# Patient Record
Sex: FEMALE | Race: WHITE | Employment: FULL TIME | URBAN - METROPOLITAN AREA
[De-identification: names, ages, dates, MRNs, and addresses within clinical notes are randomized per-mention and may not be internally consistent; named-entity substitution may affect disease eponyms.]

---

## 2017-02-09 ENCOUNTER — TELEPHONE (OUTPATIENT)
Dept: INTERNAL MEDICINE CLINIC | Facility: CLINIC | Age: 47
End: 2017-02-09

## 2017-02-09 DIAGNOSIS — M17.12 OSTEOARTHRITIS OF LEFT KNEE, UNSPECIFIED OSTEOARTHRITIS TYPE: Primary | ICD-10-CM

## 2017-02-09 DIAGNOSIS — Z00.00 ENCOUNTER FOR ROUTINE HISTORY AND PHYSICAL EXAM IN FEMALE PATIENT: ICD-10-CM

## 2017-02-09 DIAGNOSIS — M77.12 LATERAL EPICONDYLITIS, LEFT ELBOW: ICD-10-CM

## 2017-02-09 NOTE — TELEPHONE ENCOUNTER
Pt would like three referrals. First referral is for Dr. Woodfin Simmonds for follow up on the elbow. Pt has an appt on Tuesday 2-14-17 @ 3:00 pm    2nd referral is for Dr. Woodfin Simmonds for follow up on the left knee.  Pt does not have an

## 2017-02-27 ENCOUNTER — LAB ENCOUNTER (OUTPATIENT)
Dept: LAB | Facility: HOSPITAL | Age: 47
End: 2017-02-27
Attending: ORTHOPAEDIC SURGERY
Payer: COMMERCIAL

## 2017-02-27 DIAGNOSIS — M77.8 TENDONITIS OF ELBOW, LEFT: ICD-10-CM

## 2017-02-27 DIAGNOSIS — M17.12 PRIMARY LOCALIZED OSTEOARTHRITIS OF LEFT KNEE: ICD-10-CM

## 2017-02-27 LAB
BASOPHILS # BLD: 0.1 K/UL (ref 0–0.2)
BASOPHILS NFR BLD: 1 %
CRP SERPL-MCNC: 0.5 MG/DL (ref 0–0.9)
EOSINOPHIL # BLD: 0.2 K/UL (ref 0–0.7)
EOSINOPHIL NFR BLD: 4 %
ERYTHROCYTE [DISTWIDTH] IN BLOOD BY AUTOMATED COUNT: 12.5 % (ref 11–15)
ERYTHROCYTE [SEDIMENTATION RATE] IN BLOOD: 5 MM/HR (ref 0–20)
HCT VFR BLD AUTO: 34.5 % (ref 35–48)
HGB BLD-MCNC: 11.7 G/DL (ref 12–16)
LYMPHOCYTES # BLD: 1.4 K/UL (ref 1–4)
LYMPHOCYTES NFR BLD: 28 %
MCH RBC QN AUTO: 29.7 PG (ref 27–32)
MCHC RBC AUTO-ENTMCNC: 33.8 G/DL (ref 32–37)
MCV RBC AUTO: 87.9 FL (ref 80–100)
MONOCYTES # BLD: 0.5 K/UL (ref 0–1)
MONOCYTES NFR BLD: 10 %
NEUTROPHILS # BLD AUTO: 2.9 K/UL (ref 1.8–7.7)
NEUTROPHILS NFR BLD: 57 %
PLATELET # BLD AUTO: 233 K/UL (ref 140–400)
PMV BLD AUTO: 9.6 FL (ref 7.4–10.3)
RBC # BLD AUTO: 3.92 M/UL (ref 3.7–5.4)
RHEUMATOID FACT SER QL: <5 IU/ML
WBC # BLD AUTO: 5 K/UL (ref 4–11)

## 2017-02-27 PROCEDURE — 85025 COMPLETE CBC W/AUTO DIFF WBC: CPT

## 2017-02-27 PROCEDURE — 86618 LYME DISEASE ANTIBODY: CPT

## 2017-02-27 PROCEDURE — 86812 HLA TYPING A B OR C: CPT

## 2017-02-27 PROCEDURE — 86140 C-REACTIVE PROTEIN: CPT

## 2017-02-27 PROCEDURE — 86431 RHEUMATOID FACTOR QUANT: CPT

## 2017-02-27 PROCEDURE — 86225 DNA ANTIBODY NATIVE: CPT

## 2017-02-27 PROCEDURE — 86038 ANTINUCLEAR ANTIBODIES: CPT

## 2017-02-27 PROCEDURE — 84165 PROTEIN E-PHORESIS SERUM: CPT

## 2017-02-27 PROCEDURE — 85652 RBC SED RATE AUTOMATED: CPT

## 2017-02-27 PROCEDURE — 36415 COLL VENOUS BLD VENIPUNCTURE: CPT

## 2017-02-28 LAB — ANA SER QL: NEGATIVE

## 2017-03-01 LAB
ALBUMIN SERPL ELPH-MCNC: 3.65 G/DL (ref 3.8–5.8)
ALBUMIN/GLOB SERPL: 1.09 {RATIO} (ref 1–2)
ALPHA1 GLOB SERPL ELPH-MCNC: 0.22 G/DL (ref 0.1–0.3)
ALPHA2 GLOB SERPL ELPH-MCNC: 0.97 G/DL (ref 0.6–1)
B BURGDOR IGG+IGM SER QL: NEGATIVE
B-GLOBULIN SERPL ELPH-MCNC: 1.26 G/DL (ref 0.7–1.3)
DSDNA AB TITR SER: <10 {TITER}
GAMMA GLOB SERPL ELPH-MCNC: 0.9 G/DL (ref 0.5–1.7)
HLA-B27: NEGATIVE
TOTAL PROTEIN (SPECIAL TESTING): 7 G/DL (ref 6.5–9.1)

## 2017-03-02 NOTE — TELEPHONE ENCOUNTER
Please READ Message in it's entirety  Pt still need referral for OBGYN(saw one in the past here)  Pt still need referral for DR. Viveros for Elbow   Please advise once approved

## 2017-03-02 NOTE — TELEPHONE ENCOUNTER
There are two auth ref on file to Dr. Sari Rivera.  OBGYN ref generated and tasked to [facility] for signoff

## 2017-03-14 PROBLEM — M77.12 LATERAL EPICONDYLITIS, LEFT ELBOW: Status: ACTIVE | Noted: 2017-03-14

## 2017-03-14 PROBLEM — M17.12 OSTEOARTHRITIS OF LEFT KNEE: Status: ACTIVE | Noted: 2017-03-14

## 2017-06-26 ENCOUNTER — OFFICE VISIT (OUTPATIENT)
Dept: OBGYN CLINIC | Facility: CLINIC | Age: 47
End: 2017-06-26

## 2017-06-26 VITALS
HEART RATE: 65 BPM | DIASTOLIC BLOOD PRESSURE: 75 MMHG | BODY MASS INDEX: 23.42 KG/M2 | HEIGHT: 67.5 IN | SYSTOLIC BLOOD PRESSURE: 118 MMHG | WEIGHT: 151 LBS

## 2017-06-26 DIAGNOSIS — Z01.411 ENCOUNTER FOR GYNECOLOGICAL EXAMINATION WITH ABNORMAL FINDING: Primary | ICD-10-CM

## 2017-06-26 DIAGNOSIS — N63.0 LUMP OR MASS IN BREAST: ICD-10-CM

## 2017-06-26 PROCEDURE — 99386 PREV VISIT NEW AGE 40-64: CPT | Performed by: OBSTETRICS & GYNECOLOGY

## 2017-06-26 NOTE — PROGRESS NOTES
Kenji Perry is a 48year old female  Patient's last menstrual period was 2017. Patient presents with:  Gyn Exam: ANNUAL EXAM --new pt  Breast Lump: thought felt something one month ago -- not present anymore  .     OBSTETRICS HISTORY: Exercise Yes    Comment: walking and biking    Pt has a pacemaker No    Pt has a defibrillator No    Reaction to local anesthetic No     Social History Narrative    The patient does not use an assistive device. .      The patient does live in a home with s Lymphatic:no abnormal supraclavicular or axillary adenopathy is noted  Breast: normal without palpable masses, tenderness, asymmetry, nipple discharge, nipple retraction or skin changes  (+) 1 cm smooth lump in right breast only felt in sitting position

## 2017-07-18 ENCOUNTER — HOSPITAL ENCOUNTER (OUTPATIENT)
Dept: ULTRASOUND IMAGING | Facility: HOSPITAL | Age: 47
Discharge: HOME | End: 2017-07-18
Attending: OBSTETRICS & GYNECOLOGY
Payer: COMMERCIAL

## 2017-07-18 ENCOUNTER — HOSPITAL ENCOUNTER (OUTPATIENT)
Dept: MAMMOGRAPHY | Facility: HOSPITAL | Age: 47
Discharge: HOME | End: 2017-07-18
Attending: OBSTETRICS & GYNECOLOGY
Payer: COMMERCIAL

## 2017-07-18 DIAGNOSIS — N63.0 LUMP OR MASS IN BREAST: ICD-10-CM

## 2017-07-18 PROCEDURE — 77062 BREAST TOMOSYNTHESIS BI: CPT | Performed by: OBSTETRICS & GYNECOLOGY

## 2017-07-18 PROCEDURE — 76642 ULTRASOUND BREAST LIMITED: CPT | Performed by: OBSTETRICS & GYNECOLOGY

## 2017-07-18 PROCEDURE — 77066 DX MAMMO INCL CAD BI: CPT | Performed by: OBSTETRICS & GYNECOLOGY

## 2017-07-20 NOTE — PROGRESS NOTES
Send letter: Normal pap &  Negative high risk HPV. Continue with annual breast / pelvic exams (I will do pap if warrantied).

## 2017-07-20 NOTE — PROGRESS NOTES
Send letter. Normal mammogram -- simple cyst in breast on u/s. Next in one year. Encouraged to do monthly self breast exams.

## 2017-07-31 NOTE — TELEPHONE ENCOUNTER
Refill request for carbamazepine 200 mg, take 2 tabs in morning and 3 tabs at night, #150, no refills    LOV: 8/4/16  NOV: None

## 2017-08-02 ENCOUNTER — TELEPHONE (OUTPATIENT)
Dept: INTERNAL MEDICINE CLINIC | Facility: CLINIC | Age: 47
End: 2017-08-02

## 2017-08-02 RX ORDER — CARBAMAZEPINE 200 MG/1
TABLET ORAL
Qty: 150 TABLET | Refills: 0 | Status: SHIPPED | OUTPATIENT
Start: 2017-08-02 | End: 2017-08-30

## 2017-08-02 NOTE — TELEPHONE ENCOUNTER
PATIENT  NEEDS  2 REFERRAL     DR HEATHER ZAMUDIO [facility]     PATIENT HAS NOT SEEN DR Tamera Montano SINCE 2015   I EXPLAINED SHE WOULD HAVE TO MAKE APT WITH A NEW DOCTOR IN ORDER  TO GET THE REFERRALS.   SHE SAID SHE DID NOT SEE WHY SHE WOULD HAVE TO SEE SOMEONE

## 2017-08-03 NOTE — TELEPHONE ENCOUNTER
Called pt to explain by HMO rules the patient must see their primary physician at least once per year. Pt also needs to extablish with new PCP. At [address] PCP will issue referrals. Pt agreed to call back to schedule an appt.

## 2017-08-08 NOTE — PROGRESS NOTES
Normal pap &  Negative high risk HPV. Continue with annual breast / pelvic exams (I will do pap if warrantied). Letter sent via mail.

## 2017-08-09 ENCOUNTER — OFFICE VISIT (OUTPATIENT)
Dept: INTERNAL MEDICINE CLINIC | Facility: CLINIC | Age: 47
End: 2017-08-09

## 2017-08-09 VITALS
TEMPERATURE: 98 F | HEART RATE: 56 BPM | WEIGHT: 146 LBS | SYSTOLIC BLOOD PRESSURE: 115 MMHG | DIASTOLIC BLOOD PRESSURE: 70 MMHG | HEIGHT: 68 IN | BODY MASS INDEX: 22.13 KG/M2

## 2017-08-09 DIAGNOSIS — R56.9 SEIZURES (HCC): Primary | ICD-10-CM

## 2017-08-09 DIAGNOSIS — M17.12 PRIMARY OSTEOARTHRITIS OF LEFT KNEE: ICD-10-CM

## 2017-08-09 PROCEDURE — 99212 OFFICE O/P EST SF 10 MIN: CPT | Performed by: INTERNAL MEDICINE

## 2017-08-09 PROCEDURE — 99214 OFFICE O/P EST MOD 30 MIN: CPT | Performed by: INTERNAL MEDICINE

## 2017-08-09 NOTE — PROGRESS NOTES
Lemuel Marrero is a 48year old female. Patient presents with:  New Patient: referral neuro. and orthopedic doctor that is out of [facility] care.        HPI:   Pt is a   Used see dr Taisha Vasquez   C/o seizures and referrals   C/o left elbow tendonitis -- got rony recently brought some otc meds and its working , no sore throat  SKIN: denies any unusual skin lesions or rashes-previously had rash around her lips and use fluticasone OINT which had helped   RESPIRATORY: denies shortness of breath, cough, wheezing  CARDI

## 2017-08-30 RX ORDER — CARBAMAZEPINE 200 MG/1
TABLET ORAL
Qty: 150 TABLET | Refills: 0 | Status: SHIPPED | OUTPATIENT
Start: 2017-08-30 | End: 2017-09-11

## 2017-08-30 NOTE — TELEPHONE ENCOUNTER
Refill request for carbamazepine 200 mg, take 2 tabs in morning and 3 tabs in evening, #150, no refills    LOV: 8/4/16  NOV: 9/11/17

## 2017-09-11 ENCOUNTER — OFFICE VISIT (OUTPATIENT)
Dept: NEUROLOGY | Facility: CLINIC | Age: 47
End: 2017-09-11

## 2017-09-11 ENCOUNTER — APPOINTMENT (OUTPATIENT)
Dept: LAB | Facility: HOSPITAL | Age: 47
End: 2017-09-11
Attending: Other
Payer: COMMERCIAL

## 2017-09-11 VITALS
BODY MASS INDEX: 23 KG/M2 | HEART RATE: 68 BPM | RESPIRATION RATE: 16 BRPM | WEIGHT: 148 LBS | DIASTOLIC BLOOD PRESSURE: 66 MMHG | SYSTOLIC BLOOD PRESSURE: 106 MMHG

## 2017-09-11 DIAGNOSIS — G40.909 NONINTRACTABLE EPILEPSY WITHOUT STATUS EPILEPTICUS, UNSPECIFIED EPILEPSY TYPE (HCC): ICD-10-CM

## 2017-09-11 DIAGNOSIS — G40.909 NONINTRACTABLE EPILEPSY WITHOUT STATUS EPILEPTICUS, UNSPECIFIED EPILEPSY TYPE (HCC): Primary | ICD-10-CM

## 2017-09-11 LAB
ALT SERPL-CCNC: 19 U/L (ref 14–54)
AST SERPL-CCNC: 21 U/L (ref 15–41)
BASOPHILS # BLD: 0 K/UL (ref 0–0.2)
BASOPHILS NFR BLD: 1 %
EOSINOPHIL # BLD: 0.1 K/UL (ref 0–0.7)
EOSINOPHIL NFR BLD: 3 %
ERYTHROCYTE [DISTWIDTH] IN BLOOD BY AUTOMATED COUNT: 12.6 % (ref 11–15)
HCT VFR BLD AUTO: 35.9 % (ref 35–48)
HGB BLD-MCNC: 12 G/DL (ref 12–16)
LYMPHOCYTES # BLD: 1.3 K/UL (ref 1–4)
LYMPHOCYTES NFR BLD: 28 %
MCH RBC QN AUTO: 29.6 PG (ref 27–32)
MCHC RBC AUTO-ENTMCNC: 33.5 G/DL (ref 32–37)
MCV RBC AUTO: 88.3 FL (ref 80–100)
MONOCYTES # BLD: 0.4 K/UL (ref 0–1)
MONOCYTES NFR BLD: 9 %
NEUTROPHILS # BLD AUTO: 2.7 K/UL (ref 1.8–7.7)
NEUTROPHILS NFR BLD: 59 %
PLATELET # BLD AUTO: 250 K/UL (ref 140–400)
PMV BLD AUTO: 9 FL (ref 7.4–10.3)
RBC # BLD AUTO: 4.06 M/UL (ref 3.7–5.4)
WBC # BLD AUTO: 4.6 K/UL (ref 4–11)

## 2017-09-11 PROCEDURE — 36415 COLL VENOUS BLD VENIPUNCTURE: CPT

## 2017-09-11 PROCEDURE — 99213 OFFICE O/P EST LOW 20 MIN: CPT | Performed by: OTHER

## 2017-09-11 PROCEDURE — 84460 ALANINE AMINO (ALT) (SGPT): CPT

## 2017-09-11 PROCEDURE — 85025 COMPLETE CBC W/AUTO DIFF WBC: CPT | Performed by: OTHER

## 2017-09-11 PROCEDURE — 84450 TRANSFERASE (AST) (SGOT): CPT

## 2017-09-11 RX ORDER — CARBAMAZEPINE 200 MG/1
TABLET ORAL
Qty: 450 TABLET | Refills: 3 | Status: SHIPPED | OUTPATIENT
Start: 2017-09-11 | End: 2018-08-23

## 2017-09-11 NOTE — PROGRESS NOTES
She is no seizures over the last year. There is stress at home regarding two of her dogs do not get along. She has 3 dogs at home.  has a new job and bought a new car.   Follows with Dr. Nato Hernandez  and had a recent left knee injection which signifi

## 2017-12-13 ENCOUNTER — TELEPHONE (OUTPATIENT)
Dept: INTERNAL MEDICINE CLINIC | Facility: CLINIC | Age: 47
End: 2017-12-13

## 2017-12-13 DIAGNOSIS — M17.12 OSTEOARTHRITIS OF LEFT KNEE, UNSPECIFIED OSTEOARTHRITIS TYPE: Primary | ICD-10-CM

## 2017-12-13 NOTE — TELEPHONE ENCOUNTER
Pt called and requested a referral to see Dr Bird Hobbs. Pended referral.  Please review edwarois and sign off if you approve. Thank you!  Lynn Sprague [phone] [facility]

## 2017-12-26 ENCOUNTER — NURSE NAVIGATOR ENCOUNTER (OUTPATIENT)
Dept: HEMATOLOGY/ONCOLOGY | Facility: HOSPITAL | Age: 47
End: 2017-12-26

## 2017-12-26 NOTE — PROGRESS NOTES
Called to patient to follow-up from [facility] letter sent and to confirm listed family history. Discussed [facility] appointments with patient. Pt reports she will \"think about\" the [facility] appointment, does not wish to schedule at this time.   Confirmed patient's fa

## 2018-04-25 ENCOUNTER — OFFICE VISIT (OUTPATIENT)
Dept: INTERNAL MEDICINE CLINIC | Facility: CLINIC | Age: 48
End: 2018-04-25

## 2018-04-25 VITALS
HEIGHT: 68 IN | SYSTOLIC BLOOD PRESSURE: 116 MMHG | HEART RATE: 71 BPM | BODY MASS INDEX: 22.28 KG/M2 | DIASTOLIC BLOOD PRESSURE: 78 MMHG | WEIGHT: 147 LBS

## 2018-04-25 DIAGNOSIS — R56.9 SEIZURES (HCC): ICD-10-CM

## 2018-04-25 DIAGNOSIS — M54.50 ACUTE BILATERAL LOW BACK PAIN WITHOUT SCIATICA: Primary | ICD-10-CM

## 2018-04-25 DIAGNOSIS — M17.0 PRIMARY OSTEOARTHRITIS OF BOTH KNEES: ICD-10-CM

## 2018-04-25 DIAGNOSIS — M67.951 TENDINOPATHY OF RIGHT GLUTEUS MEDIUS: ICD-10-CM

## 2018-04-25 PROCEDURE — 99212 OFFICE O/P EST SF 10 MIN: CPT | Performed by: INTERNAL MEDICINE

## 2018-04-25 PROCEDURE — 99213 OFFICE O/P EST LOW 20 MIN: CPT | Performed by: INTERNAL MEDICINE

## 2018-04-25 NOTE — PATIENT INSTRUCTIONS
Tendonitis  A tendon is the thick fibrous cord that joins muscle to bone and allows joints to move. When a tendon becomes inflamed, it is called tendonitis. This can occur from overuse, injury, or infection.  This usually involves the shoulders, forearm, © 0230-5697 [employer] [address]. [address] [facility], [facility] [place]. All rights reserved. This information is not intended as a substitute for professional medical care. Always follow your healthcare professional's instructions.

## 2018-04-25 NOTE — PROGRESS NOTES
Claire Michael is a 50year old female.   Patient presents with:  Back Pain: started 2 weeks ago, aggrevated by changing postions      HPI:   Pt comes for f/u   c/c back pain   c/o back pain and knee pain -- got a shot yest and feels better on the knee arthroscopy Left 09-19-12    left knee arthropscopy   • Lasik Bilateral 2008    LASIK Plus in [place]   • Removal of ovarian cyst(s)      Laparoscopic Ovarian Cystectomy; per NG      Social History:  Smoking status: Never Smoker

## 2018-08-06 ENCOUNTER — TELEPHONE (OUTPATIENT)
Dept: INTERNAL MEDICINE CLINIC | Facility: CLINIC | Age: 48
End: 2018-08-06

## 2018-08-06 DIAGNOSIS — R56.9 SEIZURES (HCC): Primary | ICD-10-CM

## 2018-08-06 NOTE — TELEPHONE ENCOUNTER
Pt called requesting a referral to see Dr Piter Grant for follow-up visits r/t seizures. Please sign referral if you agree.  Thank you, Jenn, [facility]

## 2018-08-23 ENCOUNTER — OFFICE VISIT (OUTPATIENT)
Dept: NEUROLOGY | Facility: CLINIC | Age: 48
End: 2018-08-23

## 2018-08-23 VITALS
HEIGHT: 67.5 IN | BODY MASS INDEX: 22.49 KG/M2 | DIASTOLIC BLOOD PRESSURE: 64 MMHG | SYSTOLIC BLOOD PRESSURE: 108 MMHG | HEART RATE: 62 BPM | WEIGHT: 145 LBS

## 2018-08-23 DIAGNOSIS — R56.9 SEIZURE (HCC): Primary | ICD-10-CM

## 2018-08-23 PROCEDURE — 99212 OFFICE O/P EST SF 10 MIN: CPT | Performed by: OTHER

## 2018-08-23 RX ORDER — CARBAMAZEPINE 200 MG/1
TABLET ORAL
Qty: 450 TABLET | Refills: 3 | Status: SHIPPED | OUTPATIENT
Start: 2018-08-23 | End: 2019-10-20

## 2018-08-24 ENCOUNTER — MED REC SCAN ONLY (OUTPATIENT)
Dept: NEUROLOGY | Facility: CLINIC | Age: 48
End: 2018-08-24

## 2018-08-24 NOTE — PROGRESS NOTES
She has no neurological symptoms. No seizures over the last year. No new medical issues. No surgeries. She has 3 dogs at home. Discussed her upcoming 48th birthday. Filled out  form. Gave her prescription for CBC, liver function does.     Impre

## 2018-08-29 ENCOUNTER — OFFICE VISIT (OUTPATIENT)
Dept: INTERNAL MEDICINE CLINIC | Facility: CLINIC | Age: 48
End: 2018-08-29

## 2018-08-29 ENCOUNTER — APPOINTMENT (OUTPATIENT)
Dept: LAB | Age: 48
End: 2018-08-29
Attending: Other
Payer: COMMERCIAL

## 2018-08-29 VITALS
HEART RATE: 61 BPM | HEIGHT: 67.5 IN | BODY MASS INDEX: 23.11 KG/M2 | WEIGHT: 149 LBS | DIASTOLIC BLOOD PRESSURE: 71 MMHG | SYSTOLIC BLOOD PRESSURE: 127 MMHG

## 2018-08-29 DIAGNOSIS — R56.9 SEIZURE (HCC): ICD-10-CM

## 2018-08-29 DIAGNOSIS — M85.671 CYST OF BONE OF RIGHT FOOT: Primary | ICD-10-CM

## 2018-08-29 LAB
ALT SERPL-CCNC: 17 U/L (ref 14–54)
AST SERPL-CCNC: 20 U/L (ref 15–41)
BASOPHILS # BLD: 0 K/UL (ref 0–0.2)
BASOPHILS NFR BLD: 1 %
EOSINOPHIL # BLD: 0.2 K/UL (ref 0–0.7)
EOSINOPHIL NFR BLD: 4 %
ERYTHROCYTE [DISTWIDTH] IN BLOOD BY AUTOMATED COUNT: 12.6 % (ref 11–15)
HCT VFR BLD AUTO: 35.9 % (ref 35–48)
HGB BLD-MCNC: 12.1 G/DL (ref 12–16)
LYMPHOCYTES # BLD: 1.2 K/UL (ref 1–4)
LYMPHOCYTES NFR BLD: 33 %
MCH RBC QN AUTO: 29.5 PG (ref 27–32)
MCHC RBC AUTO-ENTMCNC: 33.6 G/DL (ref 32–37)
MCV RBC AUTO: 87.9 FL (ref 80–100)
MONOCYTES # BLD: 0.4 K/UL (ref 0–1)
MONOCYTES NFR BLD: 12 %
NEUTROPHILS # BLD AUTO: 1.9 K/UL (ref 1.8–7.7)
NEUTROPHILS NFR BLD: 50 %
PLATELET # BLD AUTO: 223 K/UL (ref 140–400)
PMV BLD AUTO: 9.5 FL (ref 7.4–10.3)
RBC # BLD AUTO: 4.09 M/UL (ref 3.7–5.4)
WBC # BLD AUTO: 3.8 K/UL (ref 4–11)

## 2018-08-29 PROCEDURE — 99212 OFFICE O/P EST SF 10 MIN: CPT | Performed by: INTERNAL MEDICINE

## 2018-08-29 PROCEDURE — 84460 ALANINE AMINO (ALT) (SGPT): CPT

## 2018-08-29 PROCEDURE — 36415 COLL VENOUS BLD VENIPUNCTURE: CPT | Performed by: OTHER

## 2018-08-29 PROCEDURE — 85025 COMPLETE CBC W/AUTO DIFF WBC: CPT | Performed by: OTHER

## 2018-08-29 PROCEDURE — 99213 OFFICE O/P EST LOW 20 MIN: CPT | Performed by: INTERNAL MEDICINE

## 2018-08-29 PROCEDURE — 84450 TRANSFERASE (AST) (SGOT): CPT

## 2018-08-29 NOTE — PROGRESS NOTES
Lara Harkins is a 50year old female.   Patient presents with:  Bump: top of right foot, pain when she puts on shoe       HPI:   Pt comes as an urgent visit   C/c right foot bump  C/o bump x one week but got worse 3 days ago -- usually wears [employer] [place]   • Removal of ovarian cyst(s)      Laparoscopic Ovarian Cystectomy; per NG      Social History:  Smoking status: Never Smoker                                                              Smokeless tobacco: Never Used                      Comment:

## 2018-08-29 NOTE — PATIENT INSTRUCTIONS
Bone Cyst  You have a bone cyst. This is a hole, or cavity, inside your bone that is filled with fluid. It looks like a hollow spot in the bone on an X-ray. It is a benign condition. This means it is not cancer.   Experts don’t know what causes bone cysts © 6341-4895 [employer] [address]. [address] [facility], [address] [place]. All rights reserved. This information is not intended as a substitute for professional medical care. Always follow your healthcare professional's instructions.         Bone Cy Date Last Reviewed: 11/20/2015  © 8446-5353 [employer] [address]. [address] [facility], [address] [place]. All rights reserved. This information is not intended as a substitute for professional medical care.  Always follow your healthcare [place]

## 2018-10-10 ENCOUNTER — LAB ENCOUNTER (OUTPATIENT)
Dept: LAB | Age: 48
End: 2018-10-10
Attending: INTERNAL MEDICINE
Payer: COMMERCIAL

## 2018-10-10 ENCOUNTER — OFFICE VISIT (OUTPATIENT)
Dept: INTERNAL MEDICINE CLINIC | Facility: CLINIC | Age: 48
End: 2018-10-10

## 2018-10-10 ENCOUNTER — TELEPHONE (OUTPATIENT)
Dept: NEUROLOGY | Facility: CLINIC | Age: 48
End: 2018-10-10

## 2018-10-10 VITALS
HEIGHT: 67.5 IN | HEART RATE: 60 BPM | SYSTOLIC BLOOD PRESSURE: 112 MMHG | WEIGHT: 147.19 LBS | BODY MASS INDEX: 22.83 KG/M2 | DIASTOLIC BLOOD PRESSURE: 71 MMHG

## 2018-10-10 DIAGNOSIS — K13.0 CHEILOSIS: ICD-10-CM

## 2018-10-10 DIAGNOSIS — S39.012A STRAIN OF LUMBAR REGION, INITIAL ENCOUNTER: Primary | ICD-10-CM

## 2018-10-10 DIAGNOSIS — J06.9 VIRAL UPPER RESPIRATORY TRACT INFECTION: ICD-10-CM

## 2018-10-10 PROCEDURE — 82607 VITAMIN B-12: CPT

## 2018-10-10 PROCEDURE — 99212 OFFICE O/P EST SF 10 MIN: CPT | Performed by: INTERNAL MEDICINE

## 2018-10-10 PROCEDURE — 80053 COMPREHEN METABOLIC PANEL: CPT

## 2018-10-10 PROCEDURE — 82746 ASSAY OF FOLIC ACID SERUM: CPT

## 2018-10-10 PROCEDURE — 36415 COLL VENOUS BLD VENIPUNCTURE: CPT

## 2018-10-10 PROCEDURE — 99214 OFFICE O/P EST MOD 30 MIN: CPT | Performed by: INTERNAL MEDICINE

## 2018-10-10 PROCEDURE — 85025 COMPLETE CBC W/AUTO DIFF WBC: CPT

## 2018-10-10 RX ORDER — CYCLOBENZAPRINE HCL 10 MG
10 TABLET ORAL NIGHTLY
Qty: 20 TABLET | Refills: 0 | Status: SHIPPED | OUTPATIENT
Start: 2018-10-10 | End: 2018-10-30

## 2018-10-10 RX ORDER — CYCLOBENZAPRINE HCL 10 MG
10 TABLET ORAL 3 TIMES DAILY
Qty: 30 TABLET | Refills: 1 | Status: SHIPPED | OUTPATIENT
Start: 2018-10-10 | End: 2018-10-10 | Stop reason: CLARIF

## 2019-01-03 ENCOUNTER — TELEPHONE (OUTPATIENT)
Dept: FAMILY MEDICINE CLINIC | Facility: CLINIC | Age: 49
End: 2019-01-03

## 2019-01-03 DIAGNOSIS — M17.12 OSTEOARTHRITIS OF LEFT KNEE, UNSPECIFIED OSTEOARTHRITIS TYPE: Primary | ICD-10-CM

## 2019-01-03 NOTE — TELEPHONE ENCOUNTER
Noted patient was seen for right foot pain and back pain--she does have a history of osteoarthritis of the knee  Noted patient had a referral December 2017--signed pended referral

## 2019-01-03 NOTE — TELEPHONE ENCOUNTER
Patient called requesting a referral to see Dr. Joie Parada for her apt on 1/8/19. Pended referral. Please review diagnosis and sign off if you agree.     Thank you,  [facility] [phone]

## 2019-02-27 ENCOUNTER — OFFICE VISIT (OUTPATIENT)
Dept: INTERNAL MEDICINE CLINIC | Facility: CLINIC | Age: 49
End: 2019-02-27

## 2019-02-27 VITALS
SYSTOLIC BLOOD PRESSURE: 105 MMHG | DIASTOLIC BLOOD PRESSURE: 65 MMHG | HEART RATE: 66 BPM | HEIGHT: 67.5 IN | BODY MASS INDEX: 23.11 KG/M2 | WEIGHT: 149 LBS

## 2019-02-27 DIAGNOSIS — R92.2 DENSE BREASTS: ICD-10-CM

## 2019-02-27 DIAGNOSIS — T14.8XXA MUSCLE STRAIN: Primary | ICD-10-CM

## 2019-02-27 DIAGNOSIS — Z12.31 VISIT FOR SCREENING MAMMOGRAM: ICD-10-CM

## 2019-02-27 PROCEDURE — 99212 OFFICE O/P EST SF 10 MIN: CPT | Performed by: INTERNAL MEDICINE

## 2019-02-27 PROCEDURE — 99213 OFFICE O/P EST LOW 20 MIN: CPT | Performed by: INTERNAL MEDICINE

## 2019-02-27 RX ORDER — CYCLOBENZAPRINE HCL 10 MG
10 TABLET ORAL 2 TIMES DAILY PRN
Qty: 20 TABLET | Refills: 1 | Status: SHIPPED | OUTPATIENT
Start: 2019-02-27 | End: 2019-03-19

## 2019-02-27 NOTE — PATIENT INSTRUCTIONS
Hip Strain    You have a strain of the muscles around the hip joint. A muscle strain is a stretching or tearing of muscle fibers. This causes pain, especially when you move that muscle. There may also be some swelling and bruising.   Home care  · Stay off · Losing the ability to put weight on the injured side  Date Last Reviewed: 11/19/2015  © 9609-3120 [employer] [address]. [address] [facility], [address] [place] [address]. All rights reserved.  This information is not intended as a substitute for professional

## 2019-03-06 ENCOUNTER — TELEPHONE (OUTPATIENT)
Dept: OTHER | Age: 49
End: 2019-03-06

## 2019-03-06 DIAGNOSIS — M17.12 OSTEOARTHRITIS OF LEFT KNEE, UNSPECIFIED OSTEOARTHRITIS TYPE: Primary | ICD-10-CM

## 2019-03-06 NOTE — TELEPHONE ENCOUNTER
Spoke with patient ( verified) and states she saw Dr. Bird Hobbs yesterday for her left knee pain. \"I'm not ready for a knee replacement, but I don't know if because I was favoring my left knee, it affected my right leg.  The pain is my right upper thi

## 2019-03-06 NOTE — TELEPHONE ENCOUNTER
Spoke with patient and advised Dr Evi Hodges note and verbalized understanding. PT number [phone] given to call for appointment.       Nery Samuel MD         3/6/19 2:07 PM   Note      Reviewed the orthopedic note– he was also saying that she may b

## 2019-03-06 NOTE — TELEPHONE ENCOUNTER
Reviewed the orthopedic note– he was also saying that she may benefit from some microvascular injections--  The right leg may be affected because of the imbalance and posture and gait   I will order PT and this can be evaluated by them and recommendations

## 2019-03-14 ENCOUNTER — OFFICE VISIT (OUTPATIENT)
Dept: PHYSICAL THERAPY | Facility: HOSPITAL | Age: 49
End: 2019-03-14
Attending: INTERNAL MEDICINE
Payer: COMMERCIAL

## 2019-03-14 DIAGNOSIS — M17.12 OSTEOARTHRITIS OF LEFT KNEE, UNSPECIFIED OSTEOARTHRITIS TYPE: ICD-10-CM

## 2019-03-14 PROCEDURE — 97162 PT EVAL MOD COMPLEX 30 MIN: CPT

## 2019-03-14 PROCEDURE — 97110 THERAPEUTIC EXERCISES: CPT

## 2019-03-14 NOTE — PROGRESS NOTES
LOWER EXTREMITY EVALUATION:   Referring Physician: Dr. Gaby Ramesh  Date of Onset: Feb 2019 Date of Service: 3/14/2019   Diagnosis: Osteoarthritis of left knee, unspecified osteoarthritis type (M17.12)  PATIENT SUMMARY:   Cindy Veliz is a 46year old bending, exercising. Pt scored 49% limitation on FOTO. Fab Pizarro would benefit from skilled Physical Therapy to address the above impairments to improve functional mobility.      In agreement with FOTO score and clinical rationale, this evaluation involve without R buttock or left knee pain. 3. Pt will demonstrate improved LE strength >4+/5 to be able to sit to stand and negotiate stairs painfree. Frequency / Duration: Patient will be seen for  2 x/week or a total of 8 visits over a 90 day period.  Judith

## 2019-03-20 ENCOUNTER — OFFICE VISIT (OUTPATIENT)
Dept: PHYSICAL THERAPY | Facility: HOSPITAL | Age: 49
End: 2019-03-20
Attending: INTERNAL MEDICINE
Payer: COMMERCIAL

## 2019-03-20 PROCEDURE — 97110 THERAPEUTIC EXERCISES: CPT

## 2019-03-20 NOTE — PROGRESS NOTES
Diagnosis:Osteoarthritis of left knee, unspecified osteoarthritis type (M17.12)    Authorized # of Visits:  8 (HMO)         Next MD visit: 4/1/19  L knee injection  Fall Risk: standard         Precautions: LBP, seizures, anxiety, OA, left arthroscope for m

## 2019-03-26 ENCOUNTER — OFFICE VISIT (OUTPATIENT)
Dept: PHYSICAL THERAPY | Facility: HOSPITAL | Age: 49
End: 2019-03-26
Attending: INTERNAL MEDICINE
Payer: COMMERCIAL

## 2019-03-26 PROCEDURE — 97110 THERAPEUTIC EXERCISES: CPT

## 2019-03-26 PROCEDURE — 97140 MANUAL THERAPY 1/> REGIONS: CPT

## 2019-03-26 NOTE — PROGRESS NOTES
Diagnosis:Osteoarthritis of left knee, unspecified osteoarthritis type (M17.12)    Authorized # of Visits:  8 (HMO)         Next MD visit: 4/1/19  L knee injection  Fall Risk: standard         Precautions: LBP, seizures, anxiety, OA, left arthroscope for m stairs painfree. Plan: Continue for stretching, strengthening, ROM, postural training, HEP training, manual therapy, balance and proprioception training. Charges:  TherEx2, MTx1      Total Timed Treatment: 40 min  Total Treatment Time: 43 min

## 2019-03-27 ENCOUNTER — TELEPHONE (OUTPATIENT)
Dept: INTERNAL MEDICINE CLINIC | Facility: CLINIC | Age: 49
End: 2019-03-27

## 2019-03-27 RX ORDER — MELOXICAM 7.5 MG/1
7.5 TABLET ORAL DAILY
Qty: 30 TABLET | Refills: 0 | Status: SHIPPED | OUTPATIENT
Start: 2019-03-27 | End: 2019-09-07

## 2019-03-27 NOTE — TELEPHONE ENCOUNTER
Pt returning call. Name and  verified. Pt states while working with Physical Therapist, she experiences pain from buttocks radiating down to back of thigh, leg and down to the calf. Pt states can feel like cramping pain.  Physical Therapist suggests medi

## 2019-03-27 NOTE — TELEPHONE ENCOUNTER
Pt stt her physical therapy wants her to ask  can she prescribe her a medication for sciatic nerve pain       Please advise

## 2019-03-27 NOTE — TELEPHONE ENCOUNTER
LMTCB. Please transfer to triage. It looks like pt's PT referral was for her knee, but she is complaining of sciatic nerve pain.   Please further inquire about pt's pain as Osteoarthritis of the knee may have a different medicine option from sciatic nerve

## 2019-03-27 NOTE — TELEPHONE ENCOUNTER
cLLED and spoke to pt and she is taking ibuprofen   Will try meloxicam and aware not to take with ibuprofen

## 2019-03-28 ENCOUNTER — OFFICE VISIT (OUTPATIENT)
Dept: PHYSICAL THERAPY | Facility: HOSPITAL | Age: 49
End: 2019-03-28
Attending: INTERNAL MEDICINE
Payer: COMMERCIAL

## 2019-03-28 PROCEDURE — 97110 THERAPEUTIC EXERCISES: CPT

## 2019-03-28 PROCEDURE — 97140 MANUAL THERAPY 1/> REGIONS: CPT

## 2019-03-28 NOTE — PROGRESS NOTES
Diagnosis:Osteoarthritis of left knee, unspecified osteoarthritis type (M17.12)    Authorized # of Visits:  8 (HMO)         Next MD visit: 4/1/19  L knee injection  Fall Risk: standard         Precautions: LBP, seizures, anxiety, OA, left arthroscope for m directional preference; initiated lumbar stabilization with pressure biofeedback. Goals:    1. Pt will be I with HEP to improve therapy outcome and self manage symptoms.   2. Pt will demonstrate improved hamstring flexibility to be able to tolerate wa

## 2019-04-01 ENCOUNTER — TELEPHONE (OUTPATIENT)
Dept: INTERNAL MEDICINE CLINIC | Facility: CLINIC | Age: 49
End: 2019-04-01

## 2019-04-01 NOTE — TELEPHONE ENCOUNTER
Pt states taking Meloxicam X  One week  With little relief. Has been going to PT for a few weeks for right hip and glut pain. Prior to Meloxicam was taking Motrin with no relief. Pt states PT wondered if Medrol pack may work better. Please advise.

## 2019-04-01 NOTE — TELEPHONE ENCOUNTER
Noted patient saw orthopedic doctor today--and received a shot--even though this was to her knee--this may be causing referred pain   this shot may help advised to hold off on steroids for now  Return to clinic if symptoms do not improve  Steroids cannot b

## 2019-04-02 ENCOUNTER — APPOINTMENT (OUTPATIENT)
Dept: PHYSICAL THERAPY | Facility: HOSPITAL | Age: 49
End: 2019-04-02
Attending: INTERNAL MEDICINE
Payer: COMMERCIAL

## 2019-04-03 ENCOUNTER — TELEPHONE (OUTPATIENT)
Dept: INTERNAL MEDICINE CLINIC | Facility: CLINIC | Age: 49
End: 2019-04-03

## 2019-04-03 ENCOUNTER — OFFICE VISIT (OUTPATIENT)
Dept: INTERNAL MEDICINE CLINIC | Facility: CLINIC | Age: 49
End: 2019-04-03

## 2019-04-03 VITALS
RESPIRATION RATE: 18 BRPM | HEIGHT: 67.5 IN | DIASTOLIC BLOOD PRESSURE: 64 MMHG | SYSTOLIC BLOOD PRESSURE: 112 MMHG | WEIGHT: 143 LBS | BODY MASS INDEX: 22.18 KG/M2 | HEART RATE: 60 BPM

## 2019-04-03 DIAGNOSIS — M67.951 TENDINOPATHY OF RIGHT GLUTEUS MEDIUS: Primary | ICD-10-CM

## 2019-04-03 PROCEDURE — 99213 OFFICE O/P EST LOW 20 MIN: CPT | Performed by: INTERNAL MEDICINE

## 2019-04-03 PROCEDURE — 99212 OFFICE O/P EST SF 10 MIN: CPT | Performed by: INTERNAL MEDICINE

## 2019-04-03 RX ORDER — METHYLPREDNISOLONE 4 MG/1
TABLET ORAL
Qty: 1 KIT | Refills: 0 | Status: SHIPPED | OUTPATIENT
Start: 2019-04-03 | End: 2019-04-16 | Stop reason: ALTCHOICE

## 2019-04-03 NOTE — PROGRESS NOTES
Marely March is a 46year old female.   Patient presents with:  Hip Pain: right   Leg Pain      HPI:   Pt comes for a f/u  C/c right hip pain   C/o so pain is still there meloxicam, cyclobenzaprine and physical therapy has not helped and thinks that Vitamins-Minerals (MULTI VITAMIN/MINERALS) Oral Tab Take  by mouth daily.  Disp:  Rfl:       Past Medical History:   Diagnosis Date   • Anxiety state, unspecified    • Hip pain    • History of back pain     per NG   • History of seizure     Seizure/Epilepsy paraspinal tenderness, no hip tenderness, straight leg test negative bilaterally, full range of motion of bilateral hips  No swelling noted except in the left knee    ASSESSMENT AND PLAN:   Diagnoses and all orders for this visit:    Tendinopathy of right

## 2019-04-03 NOTE — TELEPHONE ENCOUNTER
Pt was seen today and referred to Ortho. Pt stts she was asked by Ortho to contact PCP to request xray orders so results can be reviewed during first visit . Please advise , thank you.

## 2019-04-04 ENCOUNTER — APPOINTMENT (OUTPATIENT)
Dept: PHYSICAL THERAPY | Facility: HOSPITAL | Age: 49
End: 2019-04-04
Attending: INTERNAL MEDICINE
Payer: COMMERCIAL

## 2019-04-04 ENCOUNTER — TELEPHONE (OUTPATIENT)
Dept: OTHER | Age: 49
End: 2019-04-04

## 2019-04-04 NOTE — TELEPHONE ENCOUNTER
Pt would like to know will the Medrol Dose pack interfere with the MRI results? MRI scheduled on 4/11/19.  Please advise

## 2019-04-10 ENCOUNTER — APPOINTMENT (OUTPATIENT)
Dept: PHYSICAL THERAPY | Facility: HOSPITAL | Age: 49
End: 2019-04-10
Attending: INTERNAL MEDICINE
Payer: COMMERCIAL

## 2019-04-11 ENCOUNTER — HOSPITAL ENCOUNTER (OUTPATIENT)
Dept: MRI IMAGING | Facility: HOSPITAL | Age: 49
Discharge: HOME | End: 2019-04-11
Attending: INTERNAL MEDICINE
Payer: COMMERCIAL

## 2019-04-11 DIAGNOSIS — M67.951 TENDINOPATHY OF RIGHT GLUTEUS MEDIUS: ICD-10-CM

## 2019-04-11 PROCEDURE — 73721 MRI JNT OF LWR EXTRE W/O DYE: CPT | Performed by: INTERNAL MEDICINE

## 2019-04-16 ENCOUNTER — TELEPHONE (OUTPATIENT)
Dept: INTERNAL MEDICINE CLINIC | Facility: CLINIC | Age: 49
End: 2019-04-16

## 2019-04-16 DIAGNOSIS — M67.951 TENDINOPATHY OF RIGHT GLUTEUS MEDIUS: Primary | ICD-10-CM

## 2019-04-16 DIAGNOSIS — M67.98 UNSPECIFIED DISORDER OF SYNOVIUM AND TENDON, OTHER SITE: ICD-10-CM

## 2019-04-16 DIAGNOSIS — M17.12 OSTEOARTHRITIS OF LEFT KNEE, UNSPECIFIED OSTEOARTHRITIS TYPE: ICD-10-CM

## 2019-04-16 PROBLEM — M79.18 RIGHT BUTTOCK PAIN: Status: ACTIVE | Noted: 2019-04-16

## 2019-04-16 PROBLEM — M79.604 RIGHT LEG PAIN: Status: ACTIVE | Noted: 2019-04-16

## 2019-04-16 PROBLEM — M54.31 SCIATICA OF RIGHT SIDE: Status: ACTIVE | Noted: 2019-04-16

## 2019-04-16 NOTE — TELEPHONE ENCOUNTER
Patient requesting a referral to Dr. Eliana Mccallum office visit today at 245pm.    Please sign off this referral.    Thanks,    Luis A

## 2019-04-17 ENCOUNTER — APPOINTMENT (OUTPATIENT)
Dept: PHYSICAL THERAPY | Facility: HOSPITAL | Age: 49
End: 2019-04-17
Attending: INTERNAL MEDICINE
Payer: COMMERCIAL

## 2019-04-24 ENCOUNTER — HOSPITAL ENCOUNTER (OUTPATIENT)
Dept: MRI IMAGING | Facility: HOSPITAL | Age: 49
Discharge: HOME | End: 2019-04-24
Attending: ORTHOPAEDIC SURGERY
Payer: COMMERCIAL

## 2019-04-24 DIAGNOSIS — M54.31 SCIATICA OF RIGHT SIDE: ICD-10-CM

## 2019-04-24 DIAGNOSIS — M79.18 RIGHT BUTTOCK PAIN: ICD-10-CM

## 2019-04-24 DIAGNOSIS — M79.604 RIGHT LEG PAIN: ICD-10-CM

## 2019-04-24 PROCEDURE — 72148 MRI LUMBAR SPINE W/O DYE: CPT | Performed by: ORTHOPAEDIC SURGERY

## 2019-04-30 PROBLEM — M51.26 LUMBAR HERNIATED DISC: Status: ACTIVE | Noted: 2019-04-30

## 2019-05-01 ENCOUNTER — TELEPHONE (OUTPATIENT)
Dept: INTERNAL MEDICINE CLINIC | Facility: CLINIC | Age: 49
End: 2019-05-01

## 2019-05-01 NOTE — TELEPHONE ENCOUNTER
The referral has been placed by Dr Conchita Hoang office and this os perfectly appropriate.      Please reference referral # [id number]    Thank you, Zackery

## 2019-05-08 ENCOUNTER — OFFICE VISIT (OUTPATIENT)
Dept: PHYSICAL THERAPY | Facility: HOSPITAL | Age: 49
End: 2019-05-08
Attending: ORTHOPAEDIC SURGERY
Payer: COMMERCIAL

## 2019-05-08 DIAGNOSIS — M79.18 RIGHT BUTTOCK PAIN: ICD-10-CM

## 2019-05-08 DIAGNOSIS — M54.31 SCIATICA OF RIGHT SIDE: ICD-10-CM

## 2019-05-08 DIAGNOSIS — M51.26 LUMBAR HERNIATED DISC: ICD-10-CM

## 2019-05-08 DIAGNOSIS — M67.951 TENDINOPATHY OF RIGHT GLUTEUS MEDIUS: ICD-10-CM

## 2019-05-08 PROCEDURE — 97110 THERAPEUTIC EXERCISES: CPT

## 2019-05-08 PROCEDURE — 97162 PT EVAL MOD COMPLEX 30 MIN: CPT

## 2019-05-09 NOTE — PROGRESS NOTES
LUMBAR SPINE EVALUATION:   Referring Physician: Dr. Asaf Nix  Date of Onset: Feb 2019 Date of Service: 5/8/2019   Diagnosis: Tendinopathy of right gluteus medius (M67.98)  Lumbar herniated disc (M51.26)  Right buttock pain (M79.18)  Sciatica of right si and is worse with:   sitting in slouched position, bending over with getting dressed, and reaching up overhead. Pt exhibits decreased lumbar curve posture, as well as  decreased ROM, flexibility, and strength of the L/S region.    Pt would benefit from sk Hip Abduction NT NT    Hip Extension NT NT    Hip ER 4 4     Hip IR 4+ 4+      Flexibility:   Hamstrings: L 25 deg;  R 30 deg;     LE ROM:     R L   Hip Flex wfl wfl   ER 30 30   IR 40 40   Hip Abd wfl wfl   Hip Ext NT NT   Knee Flex wfl wfl   Knee Ext w or treatment limitation: None  Rehab Potential: good  FOTO: 49/100    Patient was advised of these findings, precautions, and treatment options and has agreed to actively participate in planning and for this course of care.     Thank you for your referral.

## 2019-05-13 ENCOUNTER — OFFICE VISIT (OUTPATIENT)
Dept: PHYSICAL THERAPY | Facility: HOSPITAL | Age: 49
End: 2019-05-13
Attending: ORTHOPAEDIC SURGERY
Payer: COMMERCIAL

## 2019-05-13 PROCEDURE — 97110 THERAPEUTIC EXERCISES: CPT

## 2019-05-13 PROCEDURE — 97140 MANUAL THERAPY 1/> REGIONS: CPT

## 2019-05-13 NOTE — PROGRESS NOTES
Diagnosis: Tendinopathy of right gluteus medius (M67.98)  Lumbar herniated disc (M51.26)  Right buttock pain (M79.18)  Sciatica of right side (M54.31)    Authorized # of Visits:  8 (HMO)         Next MD visit: none scheduled  Fall Risk: standard         Pr mechanical diagnosis and therapy (MDT) of lumbar spine with extension principles. Patient was advised that symptoms should decrease or centralize with exercises.   If there is any worsening of symptoms distally, the home program should be modified or disco

## 2019-05-15 ENCOUNTER — OFFICE VISIT (OUTPATIENT)
Dept: PHYSICAL THERAPY | Facility: HOSPITAL | Age: 49
End: 2019-05-15
Attending: ORTHOPAEDIC SURGERY
Payer: COMMERCIAL

## 2019-05-15 ENCOUNTER — OFFICE VISIT (OUTPATIENT)
Dept: PAIN CLINIC | Facility: HOSPITAL | Age: 49
End: 2019-05-15
Attending: ANESTHESIOLOGY
Payer: COMMERCIAL

## 2019-05-15 VITALS
RESPIRATION RATE: 18 BRPM | DIASTOLIC BLOOD PRESSURE: 76 MMHG | HEART RATE: 62 BPM | HEIGHT: 67.6 IN | SYSTOLIC BLOOD PRESSURE: 121 MMHG | WEIGHT: 143 LBS | BODY MASS INDEX: 21.92 KG/M2

## 2019-05-15 DIAGNOSIS — M79.18 RIGHT BUTTOCK PAIN: ICD-10-CM

## 2019-05-15 DIAGNOSIS — M54.17 LUMBOSACRAL RADICULOPATHY: ICD-10-CM

## 2019-05-15 DIAGNOSIS — M51.26 LUMBAR HERNIATED DISC: Primary | ICD-10-CM

## 2019-05-15 PROCEDURE — 99201 HC OUTPT EVAL AND MGNT NEW PT LEVEL 1: CPT

## 2019-05-15 PROCEDURE — 97140 MANUAL THERAPY 1/> REGIONS: CPT

## 2019-05-15 PROCEDURE — 97110 THERAPEUTIC EXERCISES: CPT

## 2019-05-15 NOTE — CHRONIC PAIN
[facility]  Pain Consultation     HISTORY OF PRESENT ILLNESS:  Grisel Cheung is a 46year old old female referred to the pain clinic by Dr. Milo Shoemaker for Lumbar herniated disc  (primary encounter diagnosis)  Right buttock pain  Lumbo Cystectomy; per NG       REVIEW OF SYSTEMS:   Bowel/Bladder Incontinence: as above  Coughing/sneezing/straining does  exacerbate the pain.   Numbness/tingling: as above  Weakness: as above  Weight Loss: Negative   Fever: Negative   Cardiovascular:  No curre Metastasis; per NG   • Breast Cancer Sister         early 52's   • Diabetes Neg    • Glaucoma Neg    • Heart Disorder Neg        SOCIAL HISTORY:  Social History    Socioeconomic History      Marital status:       Spouse name: Not on file      Number Concern: Not Asked        Special Diet: Not Asked        Back Care: Not Asked        Exercise: Yes          walking and biking        Bike Helmet: Not Asked        Seat Belt: Not Asked        Self-Exams: Not Asked        Grew up on a farm: Not Asked Symmetric   Right Ankle Deep tendon reflexes: Symmetric     Left Knee Deep tendon reflexes: Symmetric   Left Ankle Deep tendon reflexes: Symmetric     IMAGING:  PROCEDURE:  MRI SPINE LUMBAR (CPT=72148)     COMPARISON: [facility], MRI LUMBAR or neural foraminal compromise.              =====  CONCLUSION:   1. L5-S1:  Large inferiorly directed central/right greater than left paracentral disc protrusion/extrusion.   This results in moderate to severe right lateral recess stenosis with impingement proposed injection as planned.         Pt will return to clinic 2 weeks postinjection

## 2019-05-15 NOTE — PROGRESS NOTES
SEEN BY DR. SANCHEZ:  Tadeo Godoy: Huang presents as a 52-year-old female with a one-month history of pain in the right buttock region radiating down the posterior lateral thigh into the calf where she is experiencing cramping pain.   She de

## 2019-05-15 NOTE — PROGRESS NOTES
Diagnosis: Tendinopathy of right gluteus medius (M67.98)  Lumbar herniated disc (M51.26)  Right buttock pain (M79.18)  Sciatica of right side (M54.31)    Authorized # of Visits:  8 (HMO)         Next MD visit: none scheduled  Fall Risk: standard         Pr should decrease or centralize with exercises. If there is any worsening of symptoms distally, the home program should be modified or discontinued. Assessment: Pt responded favorably to DENISA Dietrich distraction in prone, prone lying.   Eriberto valenzuela

## 2019-05-16 ENCOUNTER — DOCUMENTATION ONLY (OUTPATIENT)
Dept: PAIN CLINIC | Facility: HOSPITAL | Age: 49
End: 2019-05-16

## 2019-05-16 NOTE — PROGRESS NOTES
Procedure code 76155-- pending insurance approval     PA started via [address] [place] [address] # [id number]    All clinical notes submitted via [facility]     Confirmation rcv'd    Once approval has been rcv'd writer reached out to the patient and will schedule procedure

## 2019-05-21 ENCOUNTER — TELEPHONE (OUTPATIENT)
Dept: PAIN CLINIC | Facility: HOSPITAL | Age: 49
End: 2019-05-21

## 2019-05-21 ENCOUNTER — DOCUMENTATION ONLY (OUTPATIENT)
Dept: PAIN CLINIC | Facility: HOSPITAL | Age: 49
End: 2019-05-21

## 2019-05-21 NOTE — PROGRESS NOTES
Procedure code 88176 APPROVED    Valid from 05/20/19--08/14/19    auth # [id number]    Writer will reach out to pt and schedule procedure.

## 2019-05-21 NOTE — TELEPHONE ENCOUNTER
Spoke with pt procedure scheduled for 05/28/19, verbal instructions given,pt verbalized understanding. Follow up appt scheduled, all questions answered. Order form sent to the surgery center, confirmation rcv'd.

## 2019-05-22 ENCOUNTER — OFFICE VISIT (OUTPATIENT)
Dept: PHYSICAL THERAPY | Facility: HOSPITAL | Age: 49
End: 2019-05-22
Attending: ORTHOPAEDIC SURGERY
Payer: COMMERCIAL

## 2019-05-22 PROCEDURE — 97110 THERAPEUTIC EXERCISES: CPT

## 2019-05-22 PROCEDURE — 97140 MANUAL THERAPY 1/> REGIONS: CPT

## 2019-05-22 NOTE — PROGRESS NOTES
Diagnosis: Tendinopathy of right gluteus medius (M67.98)  Lumbar herniated disc (M51.26)  Right buttock pain (M79.18)  Sciatica of right side (M54.31)    Authorized # of Visits:  8 (HMO)         Next MD visit: none scheduled  Fall Risk: standard         Pr lumbar support for 45 -60 minutes with minimal to no discomfort. Pt will exhibit improved Lumbar AROM for extension to promote reaching for functional ADL.      Pt will exhibit improved Lumbar AROM for flexion to be able to bend over for dressing with mi

## 2019-05-28 ENCOUNTER — APPOINTMENT (OUTPATIENT)
Dept: PHYSICAL THERAPY | Facility: HOSPITAL | Age: 49
End: 2019-05-28
Attending: ORTHOPAEDIC SURGERY
Payer: COMMERCIAL

## 2019-05-29 ENCOUNTER — APPOINTMENT (OUTPATIENT)
Dept: PHYSICAL THERAPY | Facility: HOSPITAL | Age: 49
End: 2019-05-29
Attending: ORTHOPAEDIC SURGERY
Payer: COMMERCIAL

## 2019-05-30 ENCOUNTER — OFFICE VISIT (OUTPATIENT)
Dept: PHYSICAL THERAPY | Facility: HOSPITAL | Age: 49
End: 2019-05-30
Attending: ORTHOPAEDIC SURGERY
Payer: COMMERCIAL

## 2019-05-30 PROCEDURE — 97140 MANUAL THERAPY 1/> REGIONS: CPT

## 2019-05-30 PROCEDURE — 97110 THERAPEUTIC EXERCISES: CPT

## 2019-05-30 NOTE — PROGRESS NOTES
Patient Name: Clotilde Gomez, : 10/5/1970, MRN: [id number]   Date:  2019  Referring Physician:  Estela Prater    Diagnosis: Tendinopathy of right gluteus medius (M67.98)  Lumbar herniated disc (M51.26)  Right buttock pain (M79.18)  Sci will be established in 3-4 visits. Not established  Pt will be able to sit with lumbar support for 45 -60 minutes with minimal to no discomfort. Progressing  Pt will exhibit improved Lumbar AROM for extension to promote reaching for functional ADL.     felt a little better yesterday with less pain in the calf and thigh: Reports being back at work today and being on feet increased symptoms some. Rates current pain as follows:  R lateral calf pain 4/10, post thigh 3/10.     Post Rx reports pain as follows: Rx pain as follows:   R lateral calf pain 1/10, post thigh 3/10, IN SITTING   Walking: calf 1/10, thigh 0/10;        Deferred today  Prone lying (legs IR): 3 minutes: R LE decr    Long axis traction RLE in prone x 10 10\" hold -decr, B  Prone TA 10\" x 5  P 47 min

## 2019-06-03 ENCOUNTER — OFFICE VISIT (OUTPATIENT)
Dept: PHYSICAL THERAPY | Facility: HOSPITAL | Age: 49
End: 2019-06-03
Attending: ORTHOPAEDIC SURGERY
Payer: COMMERCIAL

## 2019-06-03 PROCEDURE — 97110 THERAPEUTIC EXERCISES: CPT

## 2019-06-03 PROCEDURE — 97140 MANUAL THERAPY 1/> REGIONS: CPT

## 2019-06-04 NOTE — PROGRESS NOTES
Diagnosis: Tendinopathy of right gluteus medius (M67.98)  Lumbar herniated disc (M51.26)  Right buttock pain (M79.18)  Sciatica of right side (M54.31)    Authorized # of Visits:  8 (HMO)         Next MD visit:  June 3rd Prisca Moya;  6/12 f/u with pain clinic glut pain 2-3/10. STM R L/S p/s in L S/L  RBAR  Gentle SB mob at L4, 5 IN S/L RBAR  PA glides L4, 5 :  inc, W  Therapeutic Ex  Prone : R LE distraction 10 sec x4: Dec ,B initially but after 3-4 reps  No further effect;     Hook lying: TrA contraction wi min

## 2019-06-05 ENCOUNTER — OFFICE VISIT (OUTPATIENT)
Dept: PHYSICAL THERAPY | Facility: HOSPITAL | Age: 49
End: 2019-06-05
Attending: ORTHOPAEDIC SURGERY
Payer: COMMERCIAL

## 2019-06-05 PROCEDURE — 97110 THERAPEUTIC EXERCISES: CPT

## 2019-06-05 NOTE — PROGRESS NOTES
Diagnosis: Tendinopathy of right gluteus medius (M67.98)  Lumbar herniated disc (M51.26)  Right buttock pain (M79.18)  Sciatica of right side (M54.31)    Authorized # of Visits:  8 (HMO)         Next MD visit:  June 3rd Alesia Chilel;  6/12 f/u with pain clinic W  Therapeutic Ex  Prone : R LE distraction 10 sec x4: Dec ,B initially but after 3-4 reps  No further effect;     Hook lying: TrA contraction with BKFO 5x2 ea side  Hook lying: TrA contraction with alt hip flex 5x2 ea HEP  Hooklying TrA contracton with sin

## 2019-06-06 ENCOUNTER — OFFICE VISIT (OUTPATIENT)
Dept: PHYSICAL THERAPY | Facility: HOSPITAL | Age: 49
End: 2019-06-06
Attending: ORTHOPAEDIC SURGERY
Payer: COMMERCIAL

## 2019-06-06 PROCEDURE — 97110 THERAPEUTIC EXERCISES: CPT

## 2019-06-06 NOTE — PROGRESS NOTES
Diagnosis: Tendinopathy of right gluteus medius (M67.98)  Lumbar herniated disc (M51.26)  Right buttock pain (M79.18)  Sciatica of right side (M54.31)    Authorized # of Visits:  8 (HMO)         Next MD visit:    6/12/19 f/u with pain clinic  Fall Risk: st S/L TESS valenzuela L4, 5 :  inc, W  Therapeutic Ex  Prone : R LE distraction 10 sec x4: Dec ,B initially but after 3-4 reps  No further effect;     Hook lying: TrA contraction with BKFO 5x2 ea side  Hook lying: TrA contraction with alt hip flex 5x2 ea HEP 45 min

## 2019-06-07 ENCOUNTER — TELEPHONE (OUTPATIENT)
Dept: PHYSICAL THERAPY | Facility: HOSPITAL | Age: 49
End: 2019-06-07

## 2019-06-10 ENCOUNTER — OFFICE VISIT (OUTPATIENT)
Dept: PHYSICAL THERAPY | Facility: HOSPITAL | Age: 49
End: 2019-06-10
Attending: ORTHOPAEDIC SURGERY
Payer: COMMERCIAL

## 2019-06-10 ENCOUNTER — TELEPHONE (OUTPATIENT)
Dept: PHYSICAL THERAPY | Facility: HOSPITAL | Age: 49
End: 2019-06-10

## 2019-06-10 PROCEDURE — 97110 THERAPEUTIC EXERCISES: CPT

## 2019-06-10 NOTE — PROGRESS NOTES
Diagnosis: Tendinopathy of right gluteus medius (M67.98)  Lumbar herniated disc (M51.26)  Right buttock pain (M79.18)  Sciatica of right side (M54.31)    Authorized # of Visits:  8 (HMO)         Next MD visit:    6/12/19 f/u with pain clinic  Fall Risk: st sec ea;  R knee 3/10; R thigh 2/10,  glut 1/10;   L sidelying: R knee 0-1/10;  R thigh 1/10, R glut abolished and pain R calf 1/10;    R HS stretch with sheet 20 sec x3; calf symptoms abolished. Knee abolished, thigh pain abolished. Glut abolished.    S/L

## 2019-06-11 ENCOUNTER — TELEPHONE (OUTPATIENT)
Dept: PHYSICAL THERAPY | Facility: HOSPITAL | Age: 49
End: 2019-06-11

## 2019-06-11 ENCOUNTER — MED REC SCAN ONLY (OUTPATIENT)
Dept: INTERNAL MEDICINE CLINIC | Facility: CLINIC | Age: 49
End: 2019-06-11

## 2019-06-12 ENCOUNTER — OFFICE VISIT (OUTPATIENT)
Dept: PAIN CLINIC | Facility: HOSPITAL | Age: 49
End: 2019-06-12
Attending: ANESTHESIOLOGY
Payer: COMMERCIAL

## 2019-06-12 ENCOUNTER — OFFICE VISIT (OUTPATIENT)
Dept: PHYSICAL THERAPY | Facility: HOSPITAL | Age: 49
End: 2019-06-12
Attending: ORTHOPAEDIC SURGERY
Payer: COMMERCIAL

## 2019-06-12 VITALS
HEART RATE: 63 BPM | HEIGHT: 67.5 IN | SYSTOLIC BLOOD PRESSURE: 122 MMHG | BODY MASS INDEX: 22.49 KG/M2 | WEIGHT: 145 LBS | DIASTOLIC BLOOD PRESSURE: 76 MMHG

## 2019-06-12 DIAGNOSIS — M54.17 LUMBOSACRAL RADICULOPATHY: Primary | ICD-10-CM

## 2019-06-12 DIAGNOSIS — M51.16 LUMBAR DISC DISEASE WITH RADICULOPATHY: ICD-10-CM

## 2019-06-12 PROCEDURE — 97140 MANUAL THERAPY 1/> REGIONS: CPT

## 2019-06-12 PROCEDURE — 99211 OFF/OP EST MAY X REQ PHY/QHP: CPT

## 2019-06-12 PROCEDURE — 97110 THERAPEUTIC EXERCISES: CPT

## 2019-06-12 NOTE — CHRONIC PAIN
Follow-up Note    HISTORY OF PRESENT ILLNESS:  Helen Joyner is a 46year old female returns to the clinic for follow up s/p right transforaminal epidural injection. Pt reports about 25-30% improvement in pain.    She still has some buttock and rig Osteoarthritis of left knee     Lateral epicondylitis, left elbow     Tendinopathy of right gluteus medius     Actinic keratosis     Epilepsy ([address]     Family history of malignant neoplasm of breast     Pain in joint, upper arm     Pain in joint, pelvic re file        Non-medical: Not on file    Tobacco Use      Smoking status: Never Smoker      Smokeless tobacco: Never Used      Tobacco comment: per NG    Substance and Sexual Activity      Alcohol use:  Yes        Alcohol/week: 0.6 - 1.2 oz        Types: 1 - stairs.       PHYSICAL EXAMINATION:   06/12/19  1013   BP: 122/76   Pulse: 63      General: Alert and oriented x3  Affect:  NAD  Gait: Normal;  cane user - No  Spine: Normal    IMAGING:   PROCEDURE:  MRI SPINE LUMBAR (CPT=72148)     COMPARISON: Angelica Esparza significant spinal canal or neural foraminal compromise.              =====  CONCLUSION:   1. L5-S1:  Large inferiorly directed central/right greater than left paracentral disc protrusion/extrusion.   This results in moderate to severe right lateral recess

## 2019-06-12 NOTE — PROGRESS NOTES
Diagnosis: Tendinopathy of right gluteus medius (M67.98)  Lumbar herniated disc (M51.26)  Right buttock pain (M79.18)  Sciatica of right side (M54.31)    Authorized # of Visits:  8 (HMO)         Next MD visit:    6/12/19 f/u with pain clinic  Fall Risk: st Knee abolished, thigh pain abolished. Glut abolished.    S/L hip abd 2x10 R/L  S/L clam 2x10, 5\" holds R/L  Prone TA 10\" x 5  Prone TA with hip ext 2 x 10 r/l  Prone alt knee flexion x 20-NE   Prone over EOB hip extension with TA brace x 1    Hep: Sciati

## 2019-06-12 NOTE — PROGRESS NOTES
Presents ambulatory to [facility] for follow up, pt had Right Trans HANS on 5/2819. Reports pain is about 25-35% better, \"not as good as I hoped\".  States that mornings are better which she reports is usually the time her pain is at its worst. Currently not using

## 2019-06-13 ENCOUNTER — DOCUMENTATION ONLY (OUTPATIENT)
Dept: PAIN CLINIC | Facility: HOSPITAL | Age: 49
End: 2019-06-13

## 2019-06-13 ENCOUNTER — OFFICE VISIT (OUTPATIENT)
Dept: PHYSICAL THERAPY | Facility: HOSPITAL | Age: 49
End: 2019-06-13
Attending: ORTHOPAEDIC SURGERY
Payer: COMMERCIAL

## 2019-06-13 PROCEDURE — 97110 THERAPEUTIC EXERCISES: CPT

## 2019-06-13 NOTE — PROGRESS NOTES
Diagnosis: Tendinopathy of right gluteus medius (M67.98)  Lumbar herniated disc (M51.26)  Right buttock pain (M79.18)  Sciatica of right side (M54.31)    Authorized # of Visits:  18 (HMO)         Next MD visit:   6/24/19 (injection)  Fall Risk: standard R (D R glut)    Deferred today  Bridge with ball squeeze 2x10, 5\" holds Inc, W pain post/lateral R knee 3/10  R HS stretch with sheet 20 sec x3; calf symptoms abolished. Knee abolished, thigh pain abolished. Glut abolished.    S/L hip abd 2x10 R/L  Prone

## 2019-06-13 NOTE — PROGRESS NOTES
Procedure code 05595---HGZMTUY APPROVAL     PA started via [facility]    All clinical notes submitted    Once approval has been received, [place] will reach out to scheduled procedure.

## 2019-06-26 ENCOUNTER — OFFICE VISIT (OUTPATIENT)
Dept: PHYSICAL THERAPY | Facility: HOSPITAL | Age: 49
End: 2019-06-26
Attending: ORTHOPAEDIC SURGERY
Payer: COMMERCIAL

## 2019-06-26 PROCEDURE — 97110 THERAPEUTIC EXERCISES: CPT

## 2019-06-26 NOTE — PROGRESS NOTES
Diagnosis: Tendinopathy of right gluteus medius (M67.98)  Lumbar herniated disc (M51.26)  Right buttock pain (M79.18)  Sciatica of right side (M54.31)    Authorized # of Visits:  18 (HMO)         Next MD visit:   6/24/19 (injection) recheck : on 7/10/19  F symptoms in the R leg   Prone hip ext with flexed knee: with TrA contraction with one pillow:  R LE: noted ext rot with c/o increased symptoms in the R leg .   Prone hip ext with flexed knee: with TrA contraction with one pillow with active pushing of L ASI modified. Goals:  10 visits  Pt will exhibit improved symmetry of the L/S region to decrease discomfort with ADL. Progressing  Directional Preference if present will be established in 3-4 visits.   Not established  Pt will be able to sit with lumbar

## 2019-07-02 ENCOUNTER — OFFICE VISIT (OUTPATIENT)
Dept: PHYSICAL THERAPY | Facility: HOSPITAL | Age: 49
End: 2019-07-02
Attending: ORTHOPAEDIC SURGERY
Payer: COMMERCIAL

## 2019-07-02 PROCEDURE — 97110 THERAPEUTIC EXERCISES: CPT

## 2019-07-02 NOTE — PROGRESS NOTES
Diagnosis: Tendinopathy of right gluteus medius (M67.98)  Lumbar herniated disc (M51.26)  Right buttock pain (M79.18)  Sciatica of right side (M54.31)    Authorized # of Visits:  18 (HMO)         Next MD visit:   6/24/19 (injection) recheck : on 7/10/19  F symptoms and without ext/rot. 3 x 5 reps  Lateral step up 6\" with focus on glut RLE 2 x 10   Supine sciatic N stretch x 15   Clam with TA brace 10\" x 10 r/l     Hep:  6/26/19:  Instructed to Hold off on other ex and  focus on 2 new ex until next session reaching for functional ADL.     Progressing  Pt will exhibit improved Lumbar AROM for flexion to be able to bend over for dressing with minimal to no discomfort.     Progressing  Pt will reports improved symptoms in the AM.  Progressing  Pt will be indep w

## 2019-07-09 ENCOUNTER — OFFICE VISIT (OUTPATIENT)
Dept: PHYSICAL THERAPY | Facility: HOSPITAL | Age: 49
End: 2019-07-09
Attending: ORTHOPAEDIC SURGERY
Payer: COMMERCIAL

## 2019-07-09 PROCEDURE — 97110 THERAPEUTIC EXERCISES: CPT

## 2019-07-09 NOTE — PROGRESS NOTES
Diagnosis: Tendinopathy of right gluteus medius (M67.98)  Lumbar herniated disc (M51.26)  Right buttock pain (M79.18)  Sciatica of right side (M54.31)    Authorized # of Visits:  18 (HMO)         Next MD visit:   6/24/19 (injection) recheck : on 7/10/19  F x2, 60 sec x 2 with 1 min break in between  [facility] lying: TrA contraction using BP cuff in lumbar spine: with BKFO 10x ea   Hook lying: TrA contraction using BP cuff in lumbar spine: alt hip flex 10x ea   Hooklying: TrA contraction using BP cuff in lumbar sp

## 2019-07-11 ENCOUNTER — OFFICE VISIT (OUTPATIENT)
Dept: PHYSICAL THERAPY | Facility: HOSPITAL | Age: 49
End: 2019-07-11
Attending: ORTHOPAEDIC SURGERY
Payer: COMMERCIAL

## 2019-07-11 PROCEDURE — 97110 THERAPEUTIC EXERCISES: CPT

## 2019-07-11 NOTE — PROGRESS NOTES
Diagnosis: Tendinopathy of right gluteus medius (M67.98)  Lumbar herniated disc (M51.26)  Right buttock pain (M79.18)  Sciatica of right side (M54.31)    Authorized # of Visits:  18 (HMO)         Next MD visit:   6/24/19 (injection) recheck : on 7/10/19  F times a day with TrA contraction and post pelvic tilt and keeping B ASIS facing fwd ( ie headlights straight ahead, no rotation).      Deferred today  Supine sciatic nerv glide @ 90/90: knee extended: repeated anlke df/pf 20x RBAR  Hook lying: TrA contracti and avoiding excessive lumbar  ext/rot.      Skilled Services: TE, MT  Charges: TE x3    Total Timed Treatment: 42min  Total Treatment Time: 44 min

## 2019-07-17 ENCOUNTER — OFFICE VISIT (OUTPATIENT)
Dept: PAIN CLINIC | Facility: HOSPITAL | Age: 49
End: 2019-07-17
Attending: NURSE PRACTITIONER
Payer: COMMERCIAL

## 2019-07-17 VITALS — DIASTOLIC BLOOD PRESSURE: 69 MMHG | SYSTOLIC BLOOD PRESSURE: 117 MMHG | HEART RATE: 78 BPM

## 2019-07-17 DIAGNOSIS — M54.31 SCIATICA OF RIGHT SIDE: Primary | ICD-10-CM

## 2019-07-17 DIAGNOSIS — M51.26 LUMBAR HERNIATED DISC: ICD-10-CM

## 2019-07-17 PROCEDURE — 99211 OFF/OP EST MAY X REQ PHY/QHP: CPT

## 2019-07-17 NOTE — PROGRESS NOTES
Patient presents to [facility] ambulatory for follow up right trans enma done 6-24. This was her second injection. She reports 20% pain relief since the injection.   The pain is not too severe but the tingling in her calf and foot is really bad, no improvement in

## 2019-07-17 NOTE — CHRONIC PAIN
Follow-up Note  CC: S/P right TFESI L5/S1 6/24/19  HISTORY OF PRESENT ILLNESS:  Edgard Wang is a 46year old old female, originally referred to the pain clinic by Dr. Romulo Enacrnacion  with history of Sciatica of right side  (primary encounter diagnosis) ulcer  Genitourinary:  Negative  Integumentary :  Negative  Psychiatric:  Negative  Hematologic: No active bleeding  Lymphatic: No current lymphedema  Allergic/Immunologic:  Negative  Musculoskeletal: As above  Neurological: As above  Denies chest pain, sh Diabetes Neg    • Glaucoma Neg    • Heart Disorder Neg        SOCIAL HISTORY:  Social History    Socioeconomic History      Marital status:       Spouse name: Not on file      Number of children: Not on file      Years of education: Not on file Asked        Exercise: Yes          walking and biking        Bike Helmet: Not Asked        Seat Belt: Not Asked        Self-Exams: Not Asked        Grew up on a farm: Not Asked        History of tanning: Not Asked        Outdoor occupation: Not Asked radiating to the right hip and right leg. TECHNIQUE:    A variety of imaging planes and parameters were utilized for visualization of suspected pathology.      FINDINGS:          NUMERATION: For the purposes of this examination, the lowest fully formed S1 radiculopathy. There is also mild to moderate left lateral recess stenosis with less pronounced effacement of the left S1 nerve root. 2. Minimal additional lumbar spine degenerative changes as detailed.   No evidence of additional significant neural co

## 2019-07-18 ENCOUNTER — OFFICE VISIT (OUTPATIENT)
Dept: PHYSICAL THERAPY | Facility: HOSPITAL | Age: 49
End: 2019-07-18
Attending: ORTHOPAEDIC SURGERY
Payer: COMMERCIAL

## 2019-07-18 PROCEDURE — 97110 THERAPEUTIC EXERCISES: CPT

## 2019-07-18 NOTE — PROGRESS NOTES
Diagnosis: Tendinopathy of right gluteus medius (M67.98)  Lumbar herniated disc (M51.26)  Right buttock pain (M79.18)  Sciatica of right side (M54.31)    Authorized # of Visits:  18 (HMO)         Next MD visit:   6/24/19 (injection) recheck : on 7/10/19  F [address] per above   [employer], W  pain down leg to the knee/calf cramping   Flexion Mod Dec-hand to mid tibia Inc,W posterior R leg       STRENGTH:   -/5      Left  Right Comments   Hip Flexion (L2) 5      5     Knee Extension (L3) 5 5     K in lumbar spine: with BKFO 10x ea   Hook lying: TrA contraction using BP cuff in lumbar spine: alt hip flex 10x ea   Hooklying: TrA contraction using BP cuff in lumbar spine: alt heel slide 10x ea    Assessment: Symptoms continue.  Continued weakness noted

## 2019-07-23 ENCOUNTER — DOCUMENTATION ONLY (OUTPATIENT)
Dept: PAIN CLINIC | Facility: HOSPITAL | Age: 49
End: 2019-07-23

## 2019-07-23 NOTE — PROGRESS NOTES
Procedure code [id number] for 07/30/2019    PA needed through [facility]    All clinical notes submitted via [facility] # [id number]    Once approval has been given order will be faxed to the surgery center

## 2019-08-01 ENCOUNTER — TELEPHONE (OUTPATIENT)
Dept: INTERNAL MEDICINE CLINIC | Facility: CLINIC | Age: 49
End: 2019-08-01

## 2019-08-01 NOTE — TELEPHONE ENCOUNTER
Patient called and would like to see Dermatologist, does patient need to see PCP first or is there a recommendation for a referral?    Please advise and please contact patient with follow up.     Thank you,  [address]

## 2019-08-02 NOTE — TELEPHONE ENCOUNTER
Dr. Jaz Rivera,    Patient called back and stated if needed she will see you first, just wanting to check with dermo due to fair skin and found dark spots on skin that were concerning. If needing to make an appt with  You, please have your  contact patient.     Thank you,  [address] [place] [address] Road

## 2019-08-14 ENCOUNTER — DOCUMENTATION ONLY (OUTPATIENT)
Dept: PAIN CLINIC | Facility: HOSPITAL | Age: 49
End: 2019-08-14

## 2019-08-14 ENCOUNTER — OFFICE VISIT (OUTPATIENT)
Dept: PAIN CLINIC | Facility: HOSPITAL | Age: 49
End: 2019-08-14
Attending: NURSE PRACTITIONER
Payer: COMMERCIAL

## 2019-08-14 VITALS — DIASTOLIC BLOOD PRESSURE: 65 MMHG | SYSTOLIC BLOOD PRESSURE: 111 MMHG | HEART RATE: 73 BPM

## 2019-08-14 DIAGNOSIS — M51.26 LUMBAR HERNIATED DISC: Primary | ICD-10-CM

## 2019-08-14 PROCEDURE — 99211 OFF/OP EST MAY X REQ PHY/QHP: CPT

## 2019-08-14 NOTE — PROGRESS NOTES
Procedure code [facility] started via [address] [place] [address] # [id number]    Once approval has been given writer will reach out to pt to schedule procedure.

## 2019-08-14 NOTE — PROGRESS NOTES
Patient presents to [facility] ambulatory for injection f/u. She had a right trans enma done 7-30. This is her third steroid injection and her pain has only improved by 20% since the first shot. She does not take any pain medication.   She did PT but states she

## 2019-08-14 NOTE — CHRONIC PAIN
Follow-up Note  CC: S/P right TFESI L5-S1 7/30/19  HISTORY OF PRESENT ILLNESS:  Muriel Love is a 46year old old female, originally referred to the pain clinic by Dr. Nichole Rai  with history of Lumbar herniated disc  (primary encounter diagnosis) r palpitations   Respiratory:  No current shortness of breath   Gastrointestinal:  No active ulcer  Genitourinary:  Negative  Integumentary :  Negative  Psychiatric:  Negative  Hematologic: No active bleeding  Lymphatic: No current lymphedema  Allergic/Immun Cause of death, Metastasis; per NG   • Breast Cancer Sister         early 52's   • Diabetes Neg    • Glaucoma Neg    • Heart Disorder Neg        SOCIAL HISTORY:  Social History    Socioeconomic History      Marital status:       Spouse name: Concern: Not Asked        Weight Concern: Not Asked        Special Diet: Not Asked        Back Care: Not Asked        Exercise: Yes          walking and biking        Bike Helmet: Not Asked        Seat Belt: Not Asked        Self-Exams: Not Asked        Gr the right hip and right leg. TECHNIQUE:    A variety of imaging planes and parameters were utilized for visualization of suspected pathology.      FINDINGS:          NUMERATION: For the purposes of this examination, the lowest fully formed disc space is radiculopathy. There is also mild to moderate left lateral recess stenosis with less pronounced effacement of the left S1 nerve root. 2. Minimal additional lumbar spine degenerative changes as detailed.   No evidence of additional significant neural compr

## 2019-09-03 ENCOUNTER — OFFICE VISIT (OUTPATIENT)
Dept: OBGYN CLINIC | Facility: CLINIC | Age: 49
End: 2019-09-03

## 2019-09-03 VITALS
HEART RATE: 66 BPM | SYSTOLIC BLOOD PRESSURE: 134 MMHG | BODY MASS INDEX: 22.43 KG/M2 | WEIGHT: 144.63 LBS | DIASTOLIC BLOOD PRESSURE: 82 MMHG | HEIGHT: 67.5 IN

## 2019-09-03 DIAGNOSIS — Z01.411 ENCOUNTER FOR GYNECOLOGICAL EXAMINATION WITH ABNORMAL FINDING: Primary | ICD-10-CM

## 2019-09-03 DIAGNOSIS — N93.9 ABNORMAL VAGINAL BLEEDING: ICD-10-CM

## 2019-09-03 DIAGNOSIS — Z12.31 VISIT FOR SCREENING MAMMOGRAM: ICD-10-CM

## 2019-09-03 PROCEDURE — 99213 OFFICE O/P EST LOW 20 MIN: CPT | Performed by: OBSTETRICS & GYNECOLOGY

## 2019-09-03 PROCEDURE — 99396 PREV VISIT EST AGE 40-64: CPT | Performed by: OBSTETRICS & GYNECOLOGY

## 2019-09-03 RX ORDER — MEDROXYPROGESTERONE ACETATE 10 MG/1
10 TABLET ORAL DAILY
Qty: 10 TABLET | Refills: 0 | Status: SHIPPED | OUTPATIENT
Start: 2019-09-03 | End: 2019-10-16

## 2019-09-03 RX ORDER — MISOPROSTOL 200 UG/1
TABLET ORAL
Qty: 6 TABLET | Refills: 0 | Status: SHIPPED | OUTPATIENT
Start: 2019-09-03 | End: 2019-10-16

## 2019-09-03 NOTE — PATIENT INSTRUCTIONS
Make appt for pelvic ultrasound. Take provera one pill daily. Email me once no VB x 2 days to be added to my schedule for uterine biopsy -- use problem option on email -- NOT appt request. Continue to take provera until told to stop.   Do blood tests day b

## 2019-09-05 ENCOUNTER — TELEPHONE (OUTPATIENT)
Dept: PAIN CLINIC | Facility: HOSPITAL | Age: 49
End: 2019-09-05

## 2019-09-05 ENCOUNTER — TELEPHONE (OUTPATIENT)
Dept: OBGYN CLINIC | Facility: CLINIC | Age: 49
End: 2019-09-05

## 2019-09-05 NOTE — TELEPHONE ENCOUNTER
Pt saw Adriana Piersonwood 9/3/19 and start Provera on 9/3. Pt states that she had spotting on 9/4 that was very little. Had the spotting only when she wiped, nothing on her pad. Today no spotting or bleeding. Pt would like to the uterine biopsy on Saturday with you.

## 2019-09-05 NOTE — TELEPHONE ENCOUNTER
Informed pt [facility] is adding pt to schedule at 8:45am on Saturday. Pt aware to have TSH and HCG done tomorrow. Pt aware to take Cytotec tomorrow evening and 2 Aleve 1 hour appt. Pt verbalized understanding.

## 2019-09-05 NOTE — TELEPHONE ENCOUNTER
Attempted to reach pt x's3 to scheduled procedure. Message left requesting a call back.  Writer will send a message via [facility] [employer]

## 2019-09-06 ENCOUNTER — APPOINTMENT (OUTPATIENT)
Dept: LAB | Facility: HOSPITAL | Age: 49
End: 2019-09-06
Attending: OBSTETRICS & GYNECOLOGY
Payer: COMMERCIAL

## 2019-09-06 DIAGNOSIS — N93.9 ABNORMAL VAGINAL BLEEDING: ICD-10-CM

## 2019-09-06 LAB
HCG SERPL QL: NEGATIVE
TSI SER-ACNC: 1.22 MIU/ML (ref 0.36–3.74)

## 2019-09-06 PROCEDURE — 84703 CHORIONIC GONADOTROPIN ASSAY: CPT

## 2019-09-06 PROCEDURE — 84443 ASSAY THYROID STIM HORMONE: CPT

## 2019-09-06 PROCEDURE — 36415 COLL VENOUS BLD VENIPUNCTURE: CPT

## 2019-09-07 ENCOUNTER — OFFICE VISIT (OUTPATIENT)
Dept: OBGYN CLINIC | Facility: CLINIC | Age: 49
End: 2019-09-07

## 2019-09-07 VITALS — DIASTOLIC BLOOD PRESSURE: 86 MMHG | WEIGHT: 145 LBS | BODY MASS INDEX: 22 KG/M2 | SYSTOLIC BLOOD PRESSURE: 125 MMHG

## 2019-09-07 DIAGNOSIS — N93.8 DUB (DYSFUNCTIONAL UTERINE BLEEDING): Primary | ICD-10-CM

## 2019-09-07 DIAGNOSIS — N92.6 IRREGULAR MENSTRUAL CYCLE: ICD-10-CM

## 2019-09-07 PROCEDURE — 58100 BIOPSY OF UTERUS LINING: CPT | Performed by: OBSTETRICS & GYNECOLOGY

## 2019-09-09 ENCOUNTER — DOCUMENTATION ONLY (OUTPATIENT)
Dept: PAIN CLINIC | Facility: HOSPITAL | Age: 49
End: 2019-09-09

## 2019-09-09 NOTE — PROGRESS NOTES
Procedure code [id number]---[id number] for 09/26/19. Writer advised pt via [employer] to please call the office and schedule her follow up appt 2 weeks after procedure. Order form  Faxed to the surgery center, confirmation rcv'd.

## 2019-09-10 ENCOUNTER — TELEPHONE (OUTPATIENT)
Dept: OBGYN CLINIC | Facility: CLINIC | Age: 49
End: 2019-09-10

## 2019-09-10 NOTE — PROGRESS NOTES
Johan Thomas is a 46year old female  Patient's last menstrual period was 2019. Patient presents with:  Gyn Exam: annual exam, mammo order  Menstrual Problem: VB whole month of August -- intermittently increases. No hot flashes. Yin Baker on file        Non-medical: Not on file    Tobacco Use      Smoking status: Never Smoker      Smokeless tobacco: Never Used      Tobacco comment: per NG    Substance and Sexual Activity      Alcohol use:  Yes        Alcohol/week: 1.0 - 2.0 standard drinks The patient does live in a home with stairs.       FAMILY HISTORY:  Family History   Problem Relation Age of Onset   • Hypertension Father    • Cataracts Father    • Hypertension Mother    • Breast Cancer Mother 61        Cause of death, Metastasis; p normocephalic  Neck/Thyroid: thyroid symmetric, no thyromegaly, no nodules, no adenopathy  Lymphatic: no abnormal supraclavicular or axillary adenopathy is noted  Breast:   normal without palpable masses, tenderness, asymmetry, nipple discharge, nipple re negative.

## 2019-09-10 NOTE — TELEPHONE ENCOUNTER
Informed pt [facility] has not reviewed results. Informed pt message will be sent to [facility] and as soon as she replies we will inform pt. Pt verbalized understanding. Message to [facility]. Please review and advise pt EMBX.

## 2019-09-11 NOTE — TELEPHONE ENCOUNTER
Pt informed that JENNIE is out of the office today but when she responds we will contact her. Pt states it is OK TO LM on this line.

## 2019-09-11 NOTE — TELEPHONE ENCOUNTER
Informed pt of [facility] recs below. Pt states she stopped Provera on 9/8/19 due to pt thought Placido Culp said to stop Provera. Pt states she started bleeding again on 9/8/19. Pt unsure if she should start Provera again.  Pt states she would like to restart because pt i

## 2019-09-19 ENCOUNTER — HOSPITAL ENCOUNTER (OUTPATIENT)
Dept: ULTRASOUND IMAGING | Facility: HOSPITAL | Age: 49
Discharge: HOME | End: 2019-09-19
Attending: OBSTETRICS & GYNECOLOGY
Payer: COMMERCIAL

## 2019-09-19 DIAGNOSIS — N93.9 ABNORMAL VAGINAL BLEEDING: ICD-10-CM

## 2019-09-19 PROCEDURE — 76856 US EXAM PELVIC COMPLETE: CPT | Performed by: OBSTETRICS & GYNECOLOGY

## 2019-09-19 PROCEDURE — 76830 TRANSVAGINAL US NON-OB: CPT | Performed by: OBSTETRICS & GYNECOLOGY

## 2019-09-21 ENCOUNTER — TELEPHONE (OUTPATIENT)
Dept: OBGYN CLINIC | Facility: CLINIC | Age: 49
End: 2019-09-21

## 2019-09-21 NOTE — TELEPHONE ENCOUNTER
PT NOTIFIED OF RESULTS AND RECS. SHE DID NOT FINISH RX BECAUSE SHE WAS GOING ON A TRIP AND STATES [facility] SAID OK TO STOP. SHE DID NOT GET ANY SPOTTING OR BLEEDING AFTER THAT. EXPLAINED ENDOSEE AND ADVISED TO CALL BACK IF HAS SPOTTING OR BLEEDING AGAIN.   P

## 2019-09-21 NOTE — TELEPHONE ENCOUNTER
----- Message from Cammy Burkett MD sent at 9/20/2019 11:56 AM CDT -----  U/s essentially negative. Could still hid polyp within cavity. If abn bleeding persists after cleaning w/ provera, then needs endosee as last evaluation step.

## 2019-10-01 ENCOUNTER — TELEPHONE (OUTPATIENT)
Dept: INTERNAL MEDICINE CLINIC | Facility: CLINIC | Age: 49
End: 2019-10-01

## 2019-10-01 DIAGNOSIS — M54.17 LUMBOSACRAL RADICULOPATHY AT S1: Primary | ICD-10-CM

## 2019-10-04 NOTE — TELEPHONE ENCOUNTER
Message sent to [facility]. Referral for Chiropractor entered for Dr. Sherly Rasmussen.

## 2019-10-08 ENCOUNTER — TELEPHONE (OUTPATIENT)
Dept: INTERNAL MEDICINE CLINIC | Facility: CLINIC | Age: 49
End: 2019-10-08

## 2019-10-08 DIAGNOSIS — R56.9 SEIZURES (HCC): Primary | ICD-10-CM

## 2019-10-08 NOTE — TELEPHONE ENCOUNTER
Patient called and requested referral to Dr Joie Mortensen (Neurology) regarding her yearly seizure check up. Please sign off if agree.   Thank you,  [address]

## 2019-10-10 ENCOUNTER — OFFICE VISIT (OUTPATIENT)
Dept: PAIN CLINIC | Facility: HOSPITAL | Age: 49
End: 2019-10-10
Attending: NURSE PRACTITIONER
Payer: COMMERCIAL

## 2019-10-10 DIAGNOSIS — M51.16 LUMBAR DISC DISEASE WITH RADICULOPATHY: ICD-10-CM

## 2019-10-10 DIAGNOSIS — M51.26 LUMBAR HERNIATED DISC: Primary | ICD-10-CM

## 2019-10-10 DIAGNOSIS — M54.31 SCIATICA OF RIGHT SIDE: ICD-10-CM

## 2019-10-10 DIAGNOSIS — M54.17 LUMBOSACRAL RADICULOPATHY: ICD-10-CM

## 2019-10-10 PROCEDURE — 99211 OFF/OP EST MAY X REQ PHY/QHP: CPT

## 2019-10-10 NOTE — PROGRESS NOTES
Patient presents to [facility] ambulatory for injection follow up. She had an LESI on 9-25. She denies any relief from the injection. She states \"I'm immune to pain medicine\". HELDERN Tr in to see patient. See provider notes.

## 2019-10-11 ENCOUNTER — NURSE TRIAGE (OUTPATIENT)
Dept: INTERNAL MEDICINE CLINIC | Facility: CLINIC | Age: 49
End: 2019-10-11

## 2019-10-11 NOTE — TELEPHONE ENCOUNTER
Action Requested: Summary for Provider     []  Critical Lab, Recommendations Needed  [] Need Additional Advice  []   FYI    []   Need Orders  [] Need Medications Sent to Pharmacy  []  Other     SUMMARY: Patient wants to be seen for a pinched nerve that is

## 2019-10-11 NOTE — TELEPHONE ENCOUNTER
Per I[facility],     We need clinical documentation  as to why you are referring to Dr. Phoebe Mota for treatment.         Thanks,    [facility]

## 2019-10-11 NOTE — TELEPHONE ENCOUNTER
Patient called and I informed her of update - needs to make pcp appt, she verbally understood and I transferred her to office.     [facility]

## 2019-10-11 NOTE — TELEPHONE ENCOUNTER
Per referral ihp -cancelled request due to turnaround time,     Contacted patient and left message to contact pcp office.     Thanks,    [facility]

## 2019-10-15 ENCOUNTER — HOSPITAL ENCOUNTER (OUTPATIENT)
Dept: MAMMOGRAPHY | Facility: HOSPITAL | Age: 49
Discharge: HOME | End: 2019-10-15
Attending: OBSTETRICS & GYNECOLOGY
Payer: COMMERCIAL

## 2019-10-15 PROCEDURE — 77063 BREAST TOMOSYNTHESIS BI: CPT | Performed by: OBSTETRICS & GYNECOLOGY

## 2019-10-15 PROCEDURE — 77067 SCR MAMMO BI INCL CAD: CPT | Performed by: OBSTETRICS & GYNECOLOGY

## 2019-10-16 ENCOUNTER — OFFICE VISIT (OUTPATIENT)
Dept: INTERNAL MEDICINE CLINIC | Facility: CLINIC | Age: 49
End: 2019-10-16

## 2019-10-16 VITALS
SYSTOLIC BLOOD PRESSURE: 128 MMHG | DIASTOLIC BLOOD PRESSURE: 80 MMHG | WEIGHT: 143.81 LBS | HEIGHT: 67 IN | BODY MASS INDEX: 22.57 KG/M2 | HEART RATE: 67 BPM

## 2019-10-16 DIAGNOSIS — R21 RASH: ICD-10-CM

## 2019-10-16 DIAGNOSIS — M67.951 TENDINOPATHY OF RIGHT GLUTEUS MEDIUS: ICD-10-CM

## 2019-10-16 DIAGNOSIS — M54.17 LUMBOSACRAL RADICULOPATHY AT S1: Primary | ICD-10-CM

## 2019-10-16 PROCEDURE — 99213 OFFICE O/P EST LOW 20 MIN: CPT | Performed by: INTERNAL MEDICINE

## 2019-10-16 RX ORDER — CLOTRIMAZOLE AND BETAMETHASONE DIPROPIONATE 10; .64 MG/G; MG/G
1 CREAM TOPICAL 2 TIMES DAILY PRN
Qty: 60 G | Refills: 1 | Status: SHIPPED | OUTPATIENT
Start: 2019-10-16 | End: 2020-11-04

## 2019-10-16 NOTE — PROGRESS NOTES
Johan Thomas is a 44year old female. Patient presents with:  Pain: Pt states she is having right nerve pain near her hip, pt states pain 3 months, pt states she has had PT and pain injection treatements.        HPI:   Pt comes fr f/u  C/c right l as needed. , Disp: 60 g, Rfl: 1  carBAMazepine (TEGRETOL) 200 MG Oral Tab, Take 2 tabs in am and 3 tabs in pm, Disp: 450 tablet, Rfl: 3  ibuprofen 200 MG Oral Tab, Take 200 mg by mouth every 6 (six) hours as needed for Pain., Disp: , Rfl:   Multiple Vitamin GENERAL: well developed, well nourished,in no apparent distress  SKIN: Bilateral feet with some mild 1 mm hyperpigmented elevated spots, no redness no swelling  CARDIO: RRR without murmur  GI: good BS's,no masses or tenderness  EXTREMITIES: no cyanosis,

## 2019-10-20 NOTE — TELEPHONE ENCOUNTER
Carbamazepine 200mg Take 2 tablets in the am and 3 tablets in the colt.     Last filled-8/23/2018 for 1 year    LOV-8/23/208 NOV-12/4/2019

## 2019-10-21 RX ORDER — CARBAMAZEPINE 200 MG/1
TABLET ORAL
Qty: 150 TABLET | Refills: 0 | Status: SHIPPED | OUTPATIENT
Start: 2019-10-21 | End: 2019-11-17

## 2019-10-23 ENCOUNTER — HOSPITAL ENCOUNTER (OUTPATIENT)
Dept: MAMMOGRAPHY | Facility: HOSPITAL | Age: 49
Discharge: HOME | End: 2019-10-23
Attending: OBSTETRICS & GYNECOLOGY
Payer: COMMERCIAL

## 2019-10-23 ENCOUNTER — HOSPITAL ENCOUNTER (OUTPATIENT)
Dept: ULTRASOUND IMAGING | Facility: HOSPITAL | Age: 49
Discharge: HOME | End: 2019-10-23
Attending: INTERNAL MEDICINE
Payer: COMMERCIAL

## 2019-10-23 DIAGNOSIS — R92.8 ABNORMAL MAMMOGRAM: ICD-10-CM

## 2019-10-23 DIAGNOSIS — Z12.31 VISIT FOR SCREENING MAMMOGRAM: ICD-10-CM

## 2019-10-23 DIAGNOSIS — R92.2 DENSE BREASTS: ICD-10-CM

## 2019-10-23 PROCEDURE — 76642 ULTRASOUND BREAST LIMITED: CPT | Performed by: INTERNAL MEDICINE

## 2019-10-23 PROCEDURE — 77061 BREAST TOMOSYNTHESIS UNI: CPT | Performed by: OBSTETRICS & GYNECOLOGY

## 2019-10-23 PROCEDURE — 77065 DX MAMMO INCL CAD UNI: CPT | Performed by: OBSTETRICS & GYNECOLOGY

## 2019-11-06 ENCOUNTER — TELEPHONE (OUTPATIENT)
Dept: INTERNAL MEDICINE CLINIC | Facility: CLINIC | Age: 49
End: 2019-11-06

## 2019-11-06 DIAGNOSIS — M54.17 LUMBOSACRAL RADICULOPATHY: ICD-10-CM

## 2019-11-06 DIAGNOSIS — M67.951 TENDINOPATHY OF RIGHT GLUTEUS MEDIUS: Primary | ICD-10-CM

## 2019-11-06 NOTE — TELEPHONE ENCOUNTER
Patient requesting another referral for Dr. Ananda Ch (Chiropractor). Patient is out of visits and new referral needed with more visits. Patient wants to know if full chiropractic care can be added to referral. (ie.  Physical Therapy)

## 2019-11-07 NOTE — TELEPHONE ENCOUNTER
Good Morning      Referral has been authorized and faxed to Dr. Miri Lau  Please see ref# [id number] - for 6 visits  Thanks,    Grisel Andrewss

## 2019-11-14 ENCOUNTER — MED REC SCAN ONLY (OUTPATIENT)
Dept: INTERNAL MEDICINE CLINIC | Facility: CLINIC | Age: 49
End: 2019-11-14

## 2019-11-15 NOTE — TELEPHONE ENCOUNTER
Patient calling now to verify if chiropractor referral is authorized--Managed care message shows yes, but one referral shows cancelled and the other referral still shows pended    Please advise

## 2019-11-15 NOTE — TELEPHONE ENCOUNTER
Spoke with Charlene--she reports referral has been sent to review--she did call [facility] and states it should be authorized by later today.

## 2019-11-16 ENCOUNTER — TELEPHONE (OUTPATIENT)
Dept: INTERNAL MEDICINE CLINIC | Facility: CLINIC | Age: 49
End: 2019-11-16

## 2019-11-16 RX ORDER — METHYLPREDNISOLONE 4 MG/1
TABLET ORAL
Qty: 1 KIT | Refills: 0 | Status: SHIPPED | OUTPATIENT
Start: 2019-11-16 | End: 2019-12-04

## 2019-11-16 NOTE — TELEPHONE ENCOUNTER
Patient has seen Dr. Alfred Quintana for her back issue and is now seeing a chiropractor and her chiropractor informed her that a steroid dose pack. Would like to know if Dr. Abel Reyna prescribe that medication.

## 2019-11-16 NOTE — TELEPHONE ENCOUNTER
Patient seen in [facility] on 10/16/19 for right nerve pain near hip.   Please advise on request below, thanks

## 2019-11-16 NOTE — TELEPHONE ENCOUNTER
Pt well known to me , noted she had used a medrol dose pack April this yr -- ok to give a trial aloth not recommended for freqennt or long term use

## 2019-11-18 RX ORDER — CARBAMAZEPINE 200 MG/1
TABLET ORAL
Qty: 150 TABLET | Refills: 0 | Status: SHIPPED | OUTPATIENT
Start: 2019-11-18 | End: 2019-12-04

## 2019-11-18 NOTE — TELEPHONE ENCOUNTER
Carbamazepine 200mg Take 2 tabs in am and 3 tablet in the evening.     Last filled-10/21/2019    LOV-8/23/201/  NOV-12/4/2019

## 2019-11-18 NOTE — TELEPHONE ENCOUNTER
Patient called asking for a referral to Dr. Nato Hernandez for her left knee pain. Can DX M67.98 - Osteoarthritis of left knee be added to the current referral on file.      Thank you,  [facility]

## 2019-12-04 ENCOUNTER — APPOINTMENT (OUTPATIENT)
Dept: LAB | Facility: HOSPITAL | Age: 49
End: 2019-12-04
Attending: Other
Payer: COMMERCIAL

## 2019-12-04 ENCOUNTER — OFFICE VISIT (OUTPATIENT)
Dept: NEUROLOGY | Facility: CLINIC | Age: 49
End: 2019-12-04

## 2019-12-04 VITALS
DIASTOLIC BLOOD PRESSURE: 68 MMHG | SYSTOLIC BLOOD PRESSURE: 116 MMHG | BODY MASS INDEX: 21.98 KG/M2 | HEIGHT: 68 IN | WEIGHT: 145 LBS

## 2019-12-04 DIAGNOSIS — R56.9 SEIZURE (HCC): Primary | ICD-10-CM

## 2019-12-04 DIAGNOSIS — R56.9 SEIZURE (HCC): ICD-10-CM

## 2019-12-04 PROCEDURE — 85025 COMPLETE CBC W/AUTO DIFF WBC: CPT | Performed by: OTHER

## 2019-12-04 PROCEDURE — 84460 ALANINE AMINO (ALT) (SGPT): CPT

## 2019-12-04 PROCEDURE — 36415 COLL VENOUS BLD VENIPUNCTURE: CPT

## 2019-12-04 PROCEDURE — 99213 OFFICE O/P EST LOW 20 MIN: CPT | Performed by: OTHER

## 2019-12-04 PROCEDURE — 84450 TRANSFERASE (AST) (SGOT): CPT

## 2019-12-04 RX ORDER — CARBAMAZEPINE 200 MG/1
TABLET ORAL
Qty: 450 TABLET | Refills: 3 | Status: SHIPPED | OUTPATIENT
Start: 2019-12-04 | End: 2020-12-30

## 2019-12-04 NOTE — PROGRESS NOTES
Ms Eros Berumen, relates no seizures over the last year. She has been seen by neurosurgery, chiropractor, physical therapist for lumbar spine bilateral S1 pain. She is in no benefit. She is also had epidural injections. MRI of lumbar spine report reviewed.

## 2019-12-06 ENCOUNTER — TELEPHONE (OUTPATIENT)
Dept: CASE MANAGEMENT | Age: 49
End: 2019-12-06

## 2019-12-06 DIAGNOSIS — M54.17 LUMBOSACRAL RADICULOPATHY AT S1: Primary | ICD-10-CM

## 2019-12-06 NOTE — TELEPHONE ENCOUNTER
Hi Dr. Collie Pearson called to request a referral to see Dr. Llaa Mcdonald for a consultation for surgery.     If you agree please sign referral    Thank you  David Ayala

## 2019-12-12 ENCOUNTER — TELEPHONE (OUTPATIENT)
Dept: NEUROLOGY | Facility: CLINIC | Age: 49
End: 2019-12-12

## 2019-12-12 NOTE — TELEPHONE ENCOUNTER
Encounter routed to Dr. Jian jara, pt is Dr. Will Najera patient. Routing to Neurology pool accordingly.

## 2019-12-12 NOTE — TELEPHONE ENCOUNTER
Called patient. Patient would like to know if she can have generic Tegretol as she feels like it has always been name brand and this time the refill was sent in as Carbamazepine instead of brand name.     Per verbal discussion with , patient inform

## 2019-12-23 ENCOUNTER — TELEPHONE (OUTPATIENT)
Dept: INTERNAL MEDICINE CLINIC | Facility: CLINIC | Age: 49
End: 2019-12-23

## 2019-12-23 NOTE — TELEPHONE ENCOUNTER
Patient is reporting pain in her hips and legs. States she has a pinch never in her lower back. States OTC medicine is not working and would like to see if a pain medication can be prescribed. Transferring to triage.

## 2019-12-23 NOTE — TELEPHONE ENCOUNTER
Pt pinch nerve from L5-S1 this has been an ongoing problem , she has an appt with neurosurgeon on Mid Jan. Pt wants medication stronger than OTC. She is unable to sleep at night due to pain.      Dr Raz Maldonado, please advise

## 2019-12-24 PROBLEM — M17.12 PRIMARY OSTEOARTHRITIS OF LEFT KNEE: Status: ACTIVE | Noted: 2017-03-14

## 2019-12-24 RX ORDER — GABAPENTIN 100 MG/1
100 CAPSULE ORAL NIGHTLY PRN
Qty: 30 CAPSULE | Refills: 0 | Status: SHIPPED | OUTPATIENT
Start: 2019-12-24 | End: 2020-01-07

## 2019-12-26 ENCOUNTER — TELEPHONE (OUTPATIENT)
Dept: OTHER | Age: 49
End: 2019-12-26

## 2019-12-26 NOTE — TELEPHONE ENCOUNTER
Pt states she started taking Gabapentin 100 mg nightly as prescribed on 12/24/19 and is asking if she can increase the dose of medication.  Pt states she continues to have pinched nerve pain in her left leg and is not getting any relief from med at this stacey

## 2020-01-08 RX ORDER — GABAPENTIN 100 MG/1
CAPSULE ORAL
Qty: 30 CAPSULE | Refills: 0 | Status: SHIPPED | OUTPATIENT
Start: 2020-01-08 | End: 2020-01-08

## 2020-01-08 RX ORDER — GABAPENTIN 100 MG/1
CAPSULE ORAL
Qty: 90 CAPSULE | Refills: 0 | Status: SHIPPED | OUTPATIENT
Start: 2020-01-08 | End: 2020-11-04

## 2020-01-08 NOTE — TELEPHONE ENCOUNTER
Review pended refill request as it does not fall under a protocol.     Last Rx: 12/24/19  LOV: 2 months ago

## 2020-01-17 ENCOUNTER — MED REC SCAN ONLY (OUTPATIENT)
Dept: INTERNAL MEDICINE CLINIC | Facility: CLINIC | Age: 50
End: 2020-01-17

## 2020-01-23 ENCOUNTER — HOSPITAL ENCOUNTER (OUTPATIENT)
Dept: MRI IMAGING | Age: 50
Discharge: HOME | End: 2020-01-23
Attending: NEUROLOGICAL SURGERY
Payer: COMMERCIAL

## 2020-01-23 DIAGNOSIS — M47.27 LUMBOSACRAL RADICULOPATHY DUE TO DEGENERATIVE JOINT DISEASE OF SPINE: ICD-10-CM

## 2020-01-23 DIAGNOSIS — M48.061 LUMBAR STENOSIS: ICD-10-CM

## 2020-01-23 DIAGNOSIS — M51.26 LUMBAR DISC HERNIATION: ICD-10-CM

## 2020-01-23 PROCEDURE — 72148 MRI LUMBAR SPINE W/O DYE: CPT | Performed by: NEUROLOGICAL SURGERY

## 2020-01-24 ENCOUNTER — TELEPHONE (OUTPATIENT)
Dept: INTERNAL MEDICINE CLINIC | Facility: CLINIC | Age: 50
End: 2020-01-24

## 2020-01-24 NOTE — TELEPHONE ENCOUNTER
Patient call she is shhedule for surgery     pre op Dr. [facility] neuro surgeon Feb 3      Please advise   [phone]

## 2020-01-27 ENCOUNTER — HOSPITAL ENCOUNTER (OUTPATIENT)
Dept: GENERAL RADIOLOGY | Facility: HOSPITAL | Age: 50
Discharge: HOME | End: 2020-01-27
Attending: NEUROLOGICAL SURGERY
Payer: COMMERCIAL

## 2020-01-27 ENCOUNTER — LAB ENCOUNTER (OUTPATIENT)
Dept: LAB | Facility: HOSPITAL | Age: 50
End: 2020-01-27
Attending: NEUROLOGICAL SURGERY
Payer: COMMERCIAL

## 2020-01-27 DIAGNOSIS — M51.26 LUMBAR DISC HERNIATION: ICD-10-CM

## 2020-01-27 DIAGNOSIS — M48.061 SPINAL STENOSIS, LUMBAR REGION, WITHOUT NEUROGENIC CLAUDICATION: ICD-10-CM

## 2020-01-27 DIAGNOSIS — M47.27 LUMBOSACRAL SPONDYLOSIS WITH RADICULOPATHY: ICD-10-CM

## 2020-01-27 DIAGNOSIS — M51.26 HERNIATED LUMBAR INTERVERTEBRAL DISC: ICD-10-CM

## 2020-01-27 DIAGNOSIS — Z98.890 HISTORY OF ARTHROSCOPY OF KNEE: ICD-10-CM

## 2020-01-27 DIAGNOSIS — M48.061 SPINAL STENOSIS, LUMBAR REGION, WITHOUT NEUROGENIC CLAUDICATION: Primary | ICD-10-CM

## 2020-01-27 LAB
ALBUMIN SERPL-MCNC: 4.4 G/DL (ref 3.4–5)
ALBUMIN/GLOB SERPL: 1.4 {RATIO} (ref 1–2)
ALP LIVER SERPL-CCNC: 71 U/L (ref 41–108)
ALT SERPL-CCNC: 21 U/L (ref 13–56)
ANION GAP SERPL CALC-SCNC: 5 MMOL/L (ref 0–18)
APTT PPP: 27 SECONDS (ref 23.2–35.3)
AST SERPL-CCNC: 17 U/L (ref 15–37)
BASOPHILS # BLD AUTO: 0.06 X10(3) UL (ref 0–0.2)
BASOPHILS NFR BLD AUTO: 1.1 %
BILIRUB SERPL-MCNC: 0.4 MG/DL (ref 0.1–2)
BUN BLD-MCNC: 14 MG/DL (ref 7–18)
BUN/CREAT SERPL: 17.7 (ref 10–20)
CALCIUM BLD-MCNC: 9.5 MG/DL (ref 8.5–10.1)
CHLORIDE SERPL-SCNC: 105 MMOL/L (ref 98–112)
CO2 SERPL-SCNC: 30 MMOL/L (ref 21–32)
CREAT BLD-MCNC: 0.79 MG/DL (ref 0.55–1.02)
DEPRECATED RDW RBC AUTO: 37.4 FL (ref 35.1–46.3)
EOSINOPHIL # BLD AUTO: 0.12 X10(3) UL (ref 0–0.7)
EOSINOPHIL NFR BLD AUTO: 2.2 %
ERYTHROCYTE [DISTWIDTH] IN BLOOD BY AUTOMATED COUNT: 12.1 % (ref 11–15)
GLOBULIN PLAS-MCNC: 3.2 G/DL (ref 2.8–4.4)
GLUCOSE BLD-MCNC: 91 MG/DL (ref 70–99)
HCT VFR BLD AUTO: 38.1 % (ref 35–48)
HGB BLD-MCNC: 12.7 G/DL (ref 12–16)
IMM GRANULOCYTES # BLD AUTO: 0.01 X10(3) UL (ref 0–1)
IMM GRANULOCYTES NFR BLD: 0.2 %
INR BLD: 1.01 (ref 0.9–1.2)
LYMPHOCYTES # BLD AUTO: 2.01 X10(3) UL (ref 1–4)
LYMPHOCYTES NFR BLD AUTO: 36.8 %
M PROTEIN MFR SERPL ELPH: 7.6 G/DL (ref 6.4–8.2)
MCH RBC QN AUTO: 28.5 PG (ref 26–34)
MCHC RBC AUTO-ENTMCNC: 33.3 G/DL (ref 31–37)
MCV RBC AUTO: 85.4 FL (ref 80–100)
MONOCYTES # BLD AUTO: 0.54 X10(3) UL (ref 0.1–1)
MONOCYTES NFR BLD AUTO: 9.9 %
NEUTROPHILS # BLD AUTO: 2.72 X10 (3) UL (ref 1.5–7.7)
NEUTROPHILS # BLD AUTO: 2.72 X10(3) UL (ref 1.5–7.7)
NEUTROPHILS NFR BLD AUTO: 49.8 %
OSMOLALITY SERPL CALC.SUM OF ELEC: 290 MOSM/KG (ref 275–295)
PATIENT FASTING Y/N/NP: YES
PLATELET # BLD AUTO: 354 10(3)UL (ref 150–450)
POTASSIUM SERPL-SCNC: 3.7 MMOL/L (ref 3.5–5.1)
PROTHROMBIN TIME: 13.1 SECONDS (ref 11.8–14.5)
RBC # BLD AUTO: 4.46 X10(6)UL (ref 3.8–5.3)
SODIUM SERPL-SCNC: 140 MMOL/L (ref 136–145)
WBC # BLD AUTO: 5.5 X10(3) UL (ref 4–11)

## 2020-01-27 PROCEDURE — 80053 COMPREHEN METABOLIC PANEL: CPT

## 2020-01-27 PROCEDURE — 71046 X-RAY EXAM CHEST 2 VIEWS: CPT | Performed by: NEUROLOGICAL SURGERY

## 2020-01-27 PROCEDURE — 36415 COLL VENOUS BLD VENIPUNCTURE: CPT

## 2020-01-27 PROCEDURE — 93005 ELECTROCARDIOGRAM TRACING: CPT

## 2020-01-27 PROCEDURE — 85025 COMPLETE CBC W/AUTO DIFF WBC: CPT

## 2020-01-27 PROCEDURE — 93010 ELECTROCARDIOGRAM REPORT: CPT | Performed by: NEUROLOGICAL SURGERY

## 2020-01-27 PROCEDURE — 85610 PROTHROMBIN TIME: CPT

## 2020-01-27 PROCEDURE — 85730 THROMBOPLASTIN TIME PARTIAL: CPT

## 2020-01-27 PROCEDURE — 87641 MR-STAPH DNA AMP PROBE: CPT

## 2020-01-28 LAB — MRSA DNA SPEC QL NAA+PROBE: NEGATIVE

## 2020-01-29 ENCOUNTER — OFFICE VISIT (OUTPATIENT)
Dept: INTERNAL MEDICINE CLINIC | Facility: CLINIC | Age: 50
End: 2020-01-29

## 2020-01-29 VITALS
HEART RATE: 74 BPM | HEIGHT: 68 IN | BODY MASS INDEX: 21.52 KG/M2 | DIASTOLIC BLOOD PRESSURE: 77 MMHG | SYSTOLIC BLOOD PRESSURE: 117 MMHG | TEMPERATURE: 98 F | WEIGHT: 142 LBS | RESPIRATION RATE: 16 BRPM

## 2020-01-29 DIAGNOSIS — M54.17 LUMBOSACRAL RADICULOPATHY: ICD-10-CM

## 2020-01-29 DIAGNOSIS — Z01.818 PREOP EXAMINATION: Primary | ICD-10-CM

## 2020-01-29 DIAGNOSIS — Z23 NEED FOR VACCINATION: ICD-10-CM

## 2020-01-29 PROCEDURE — 90686 IIV4 VACC NO PRSV 0.5 ML IM: CPT | Performed by: INTERNAL MEDICINE

## 2020-01-29 PROCEDURE — 99213 OFFICE O/P EST LOW 20 MIN: CPT | Performed by: INTERNAL MEDICINE

## 2020-01-29 PROCEDURE — 90471 IMMUNIZATION ADMIN: CPT | Performed by: INTERNAL MEDICINE

## 2020-01-29 NOTE — PROGRESS NOTES
Grisel Cheung is a 44year old female.   Patient presents with:  Surgical/procedure Clearance: Patient present for surgical clearance      HPI:   Patient comes for preop  C/C preop-- right L5S1 MLD  Requested by dinorah Mcgee   Notes can be seen i • Anxiety state, unspecified    • Hip pain    • History of back pain     per NG   • History of seizure     Seizure/Epilepsy.  Rx: Tegretol; per NG   • History of torn meniscus of left knee 2012    Arthroscopy; per NG   • Lipid screening 5/10/2014    per N suspicious lesions  HEENT: atraumatic, normocephalic,throat are clear,   No frontal or maxillary sinus tenderness, pupils equal reactive to light bilaterally, extraocular muscles intact   neck: supple,no adenopathy, full range of motion of the neck on flex

## 2020-02-19 ENCOUNTER — MED REC SCAN ONLY (OUTPATIENT)
Dept: INTERNAL MEDICINE CLINIC | Facility: CLINIC | Age: 50
End: 2020-02-19

## 2020-02-26 ENCOUNTER — TELEPHONE (OUTPATIENT)
Dept: PHYSICAL THERAPY | Age: 50
End: 2020-02-26

## 2020-02-26 ENCOUNTER — APPOINTMENT (OUTPATIENT)
Dept: PHYSICAL THERAPY | Age: 50
End: 2020-02-26
Attending: NEUROLOGICAL SURGERY
Payer: COMMERCIAL

## 2020-03-03 ENCOUNTER — OFFICE VISIT (OUTPATIENT)
Dept: PHYSICAL THERAPY | Age: 50
End: 2020-03-03
Attending: NEUROLOGICAL SURGERY
Payer: COMMERCIAL

## 2020-03-03 PROCEDURE — 97161 PT EVAL LOW COMPLEX 20 MIN: CPT

## 2020-03-03 NOTE — PROGRESS NOTES
SPINE EVALUATION:   Referring Physician: Dr. Gale Race  Diagnosis: s/p L5-S1 microdisectomy       Date of Service: 3/3/2020     PATIENT SUMMARY   Zelda Patterson is a 44year old y/o female who presents to therapy today with complaints of residual RLE nu Lipid screening (5/10/2014), Lower back pain, Osteoarthritis, and Seizure disorder ([facility] [address] She also has no past medical history of Bursitis.        Pt denies diplopia, dysarthria, dysphasia, dizziness, drop attacks, bowel/bladder changes, saddle anesthesia, Flexibility:   LE   Hip Flexor: nil loss   Hamstrings: R 40 deg loss; L 10 deg loss  Quads: min loss   Gastroc-soleus: min loss      Special tests:   NT  (weakness R heel raise)    Gait: pt ambulates on level ground with decreased stance phase (RLE); d Potential:good    FOTO: 72/100 (predicted 76/100)    The patient was advised of these findings, precautions, and treatment options and has agreed to actively participate in planning and for this course of care.     Thank you for your referral. Please co-sig

## 2020-03-03 NOTE — TELEPHONE ENCOUNTER
Reviewed notes-not sure what she is seeing the dermatologist for-never discussed--unless it is for her lip issues if it has come back.   Please ask her what she needs to see the dermatologist for and then we can take it from there-thank you 03-Mar-2020

## 2020-03-05 ENCOUNTER — OFFICE VISIT (OUTPATIENT)
Dept: PHYSICAL THERAPY | Age: 50
End: 2020-03-05
Attending: NEUROLOGICAL SURGERY
Payer: COMMERCIAL

## 2020-03-05 PROCEDURE — 97110 THERAPEUTIC EXERCISES: CPT

## 2020-03-05 NOTE — PROGRESS NOTES
Dx:  M48.061 (ICD-10-CM) - 724.02 (ICD-9-CM) - Bilateral stenosis of lateral recess of lumbar spine           Insurance (Authorized # of Visits): (pending)          Authorizing Physician: Dr. Yvonne Dow MD visit: March 16, 2020   Fall Risk: standard

## 2020-03-11 ENCOUNTER — OFFICE VISIT (OUTPATIENT)
Dept: PHYSICAL THERAPY | Age: 50
End: 2020-03-11
Attending: NEUROLOGICAL SURGERY
Payer: COMMERCIAL

## 2020-03-11 PROCEDURE — 97110 THERAPEUTIC EXERCISES: CPT | Performed by: PHYSICAL THERAPIST

## 2020-03-11 NOTE — PROGRESS NOTES
Dx:  M48.061 (ICD-10-CM) - 724.02 (ICD-9-CM) - Bilateral stenosis of lateral recess of lumbar spine; s/p lumbar microdiscectomy  2/3/2020  Insurance (Authorized # of Visits): (pending)          Authorizing Physician: Dr. Yarelis Dow MD visit: March 16, 2020 stretch 3 x 20 cts            There Ex:   Standing B heel/toe raises x 30 reps   6 inch and 12 inch step up R x 10 reps each  Stationary lunges x 10 reps each   Seated L/R LAQ/sciatic nerve glides x 10 reps each  Supine L/R hamstrings stretch 2 x 30 sec ea

## 2020-03-12 ENCOUNTER — ORDER TRANSCRIPTION (OUTPATIENT)
Dept: PHYSICAL THERAPY | Age: 50
End: 2020-03-12

## 2020-03-12 ENCOUNTER — TELEPHONE (OUTPATIENT)
Dept: OTHER | Age: 50
End: 2020-03-12

## 2020-03-12 DIAGNOSIS — Z98.890 STATUS POST LUMBAR DISCECTOMY: Primary | ICD-10-CM

## 2020-03-12 NOTE — TELEPHONE ENCOUNTER
Referral has been sent to [employer] for an approval.    Thank you, [facility] AND [facility].

## 2020-03-12 NOTE — TELEPHONE ENCOUNTER
Need order for PT s/p surgery lumbar discectomy  Order written is pending attached to HMO referral which is being processed by [employer]. Spoke with manage care dept will send to referral coordinator to expedite.

## 2020-03-16 ENCOUNTER — APPOINTMENT (OUTPATIENT)
Dept: PHYSICAL THERAPY | Age: 50
End: 2020-03-16
Attending: NEUROLOGICAL SURGERY
Payer: COMMERCIAL

## 2020-03-16 ENCOUNTER — TELEPHONE (OUTPATIENT)
Dept: PHYSICAL THERAPY | Age: 50
End: 2020-03-16

## 2020-03-17 ENCOUNTER — HOSPITAL ENCOUNTER (OUTPATIENT)
Dept: ULTRASOUND IMAGING | Facility: HOSPITAL | Age: 50
Discharge: HOME | End: 2020-03-17
Attending: NEUROLOGICAL SURGERY
Payer: COMMERCIAL

## 2020-03-17 DIAGNOSIS — M79.604 RIGHT LEG PAIN: ICD-10-CM

## 2020-03-17 DIAGNOSIS — M47.27 LUMBOSACRAL SPONDYLOSIS WITH RADICULOPATHY: ICD-10-CM

## 2020-03-17 PROCEDURE — 93971 EXTREMITY STUDY: CPT | Performed by: NEUROLOGICAL SURGERY

## 2020-03-17 NOTE — TELEPHONE ENCOUNTER
Called and spoke with patient re:  policy effective today of cancellation of PT sessions through the end of March in light of [address], plan to resume 4/1 pending COVD19 status. Reviewed HEP with patient, advised gentle hamstrings stretches (without DF).  Brigette Douglas

## 2020-03-18 ENCOUNTER — APPOINTMENT (OUTPATIENT)
Dept: PHYSICAL THERAPY | Age: 50
End: 2020-03-18
Attending: NEUROLOGICAL SURGERY
Payer: COMMERCIAL

## 2020-03-23 ENCOUNTER — APPOINTMENT (OUTPATIENT)
Dept: PHYSICAL THERAPY | Age: 50
End: 2020-03-23
Attending: NEUROLOGICAL SURGERY
Payer: COMMERCIAL

## 2020-03-25 ENCOUNTER — APPOINTMENT (OUTPATIENT)
Dept: PHYSICAL THERAPY | Age: 50
End: 2020-03-25
Attending: NEUROLOGICAL SURGERY
Payer: COMMERCIAL

## 2020-03-25 ENCOUNTER — TELEPHONE (OUTPATIENT)
Dept: PHYSICAL THERAPY | Age: 50
End: 2020-03-25

## 2020-03-30 ENCOUNTER — APPOINTMENT (OUTPATIENT)
Dept: PHYSICAL THERAPY | Age: 50
End: 2020-03-30
Attending: NEUROLOGICAL SURGERY
Payer: COMMERCIAL

## 2020-04-01 ENCOUNTER — APPOINTMENT (OUTPATIENT)
Dept: PHYSICAL THERAPY | Age: 50
End: 2020-04-01
Attending: NEUROLOGICAL SURGERY
Payer: COMMERCIAL

## 2020-04-06 ENCOUNTER — APPOINTMENT (OUTPATIENT)
Dept: PHYSICAL THERAPY | Age: 50
End: 2020-04-06
Attending: NEUROLOGICAL SURGERY
Payer: COMMERCIAL

## 2020-04-08 ENCOUNTER — APPOINTMENT (OUTPATIENT)
Dept: PHYSICAL THERAPY | Age: 50
End: 2020-04-08
Attending: NEUROLOGICAL SURGERY
Payer: COMMERCIAL

## 2020-04-08 ENCOUNTER — TELEPHONE (OUTPATIENT)
Dept: PHYSICAL THERAPY | Age: 50
End: 2020-04-08

## 2020-04-08 NOTE — TELEPHONE ENCOUNTER
Contacted pt to explain that due to continued COVID-19 outbreak, non-essential outpatient rehab patient care has been delayed until 5/4/20. Discussed patient's current symptoms, home program, and concerns at this point.  Gave recommendations specific to her

## 2020-04-13 ENCOUNTER — APPOINTMENT (OUTPATIENT)
Dept: PHYSICAL THERAPY | Age: 50
End: 2020-04-13
Attending: NEUROLOGICAL SURGERY
Payer: COMMERCIAL

## 2020-04-15 ENCOUNTER — APPOINTMENT (OUTPATIENT)
Dept: PHYSICAL THERAPY | Age: 50
End: 2020-04-15
Attending: NEUROLOGICAL SURGERY
Payer: COMMERCIAL

## 2020-04-20 ENCOUNTER — APPOINTMENT (OUTPATIENT)
Dept: PHYSICAL THERAPY | Age: 50
End: 2020-04-20
Attending: NEUROLOGICAL SURGERY
Payer: COMMERCIAL

## 2020-04-22 ENCOUNTER — APPOINTMENT (OUTPATIENT)
Dept: PHYSICAL THERAPY | Age: 50
End: 2020-04-22
Attending: NEUROLOGICAL SURGERY
Payer: COMMERCIAL

## 2020-04-27 ENCOUNTER — APPOINTMENT (OUTPATIENT)
Dept: PHYSICAL THERAPY | Age: 50
End: 2020-04-27
Attending: NEUROLOGICAL SURGERY
Payer: COMMERCIAL

## 2020-04-29 ENCOUNTER — APPOINTMENT (OUTPATIENT)
Dept: PHYSICAL THERAPY | Age: 50
End: 2020-04-29
Attending: NEUROLOGICAL SURGERY
Payer: COMMERCIAL

## 2020-04-30 ENCOUNTER — TELEPHONE (OUTPATIENT)
Dept: PHYSICAL THERAPY | Age: 50
End: 2020-04-30

## 2020-04-30 NOTE — TELEPHONE ENCOUNTER
Patient would like to re-start therapy and be re-evaluated. She would like to go to the [facility] location due to work schedule.   Will cancel appointments at [facility] and reschedule at [facility]

## 2020-06-23 ENCOUNTER — HOSPITAL ENCOUNTER (OUTPATIENT)
Dept: ULTRASOUND IMAGING | Facility: HOSPITAL | Age: 50
Discharge: HOME | End: 2020-06-23
Attending: ORTHOPAEDIC SURGERY
Payer: COMMERCIAL

## 2020-06-23 DIAGNOSIS — R09.89 DIMINISHED PULSES IN LOWER EXTREMITY: ICD-10-CM

## 2020-06-23 DIAGNOSIS — M17.12 PRIMARY OSTEOARTHRITIS OF LEFT KNEE: ICD-10-CM

## 2020-06-23 PROCEDURE — 93922 UPR/L XTREMITY ART 2 LEVELS: CPT | Performed by: ORTHOPAEDIC SURGERY

## 2020-11-04 ENCOUNTER — OFFICE VISIT (OUTPATIENT)
Dept: INTERNAL MEDICINE CLINIC | Facility: CLINIC | Age: 50
End: 2020-11-04

## 2020-11-04 VITALS
SYSTOLIC BLOOD PRESSURE: 125 MMHG | DIASTOLIC BLOOD PRESSURE: 73 MMHG | BODY MASS INDEX: 22.53 KG/M2 | HEART RATE: 62 BPM | HEIGHT: 68 IN | WEIGHT: 148.63 LBS

## 2020-11-04 DIAGNOSIS — Z01.419 ENCOUNTER FOR ROUTINE GYNECOLOGICAL EXAMINATION WITH PAPANICOLAOU SMEAR OF CERVIX: ICD-10-CM

## 2020-11-04 DIAGNOSIS — B07.0 PLANTAR WART OF RIGHT FOOT: Primary | ICD-10-CM

## 2020-11-04 DIAGNOSIS — Z12.11 COLON CANCER SCREENING: ICD-10-CM

## 2020-11-04 DIAGNOSIS — Z23 NEED FOR VACCINATION: ICD-10-CM

## 2020-11-04 DIAGNOSIS — Z12.31 SCREENING MAMMOGRAM, ENCOUNTER FOR: ICD-10-CM

## 2020-11-04 PROCEDURE — 3008F BODY MASS INDEX DOCD: CPT | Performed by: INTERNAL MEDICINE

## 2020-11-04 PROCEDURE — 3074F SYST BP LT 130 MM HG: CPT | Performed by: INTERNAL MEDICINE

## 2020-11-04 PROCEDURE — 99213 OFFICE O/P EST LOW 20 MIN: CPT | Performed by: INTERNAL MEDICINE

## 2020-11-04 PROCEDURE — 90471 IMMUNIZATION ADMIN: CPT | Performed by: INTERNAL MEDICINE

## 2020-11-04 PROCEDURE — 3078F DIAST BP <80 MM HG: CPT | Performed by: INTERNAL MEDICINE

## 2020-11-04 PROCEDURE — 90686 IIV4 VACC NO PRSV 0.5 ML IM: CPT | Performed by: INTERNAL MEDICINE

## 2020-11-04 NOTE — PROGRESS NOTES
Shannon Santiago is a 44year old female. Patient presents with:  Derm Problem: right foot       HPI:   Urgent  visit   C/c right foot ?  Wart   C/o chester remember when she first noticed it -- tried otc meds -- not helping -- tried pads and also freezi LASIK Plus in [place]   • Removal of ovarian cyst(s)      Laparoscopic Ovarian Cystectomy; per BETTY      Social History:  Social History    Tobacco Use      Smoking status: Never Smoker      Smokeless tobacco: Never Used      Tobacco comment: per NG    [facility] will refer  Colon cancer screening  -     GASTRO - INTERNAL  Will refer  Encounter for routine gynecological examination with Papanicolaou smear of cervix  -     OBG - INTERNAL  Will refer      Preventive medicine  Flu shot today  Mammogram ordered, last o

## 2020-11-04 NOTE — PATIENT INSTRUCTIONS
Treating Warts     You and your healthcare provider can discuss whether your warts need to be treated. You and your healthcare provider can talk about what treatment may be best for your wart or warts.  To get rid of your warts, you may need to try more · Shots (injections). These can be used to treat warts that don’t respond to other treatments, such as stubborn or painful warts around the nails. This is done in the provider’s office.     When to get medical treatment  It’s a good idea to have your health Warts are caused by the human papillomavirus (HPV). There are over 150 types of HPV. This virus can spread between people. You can be exposed to the virus and not get warts. Warts tend to form where skin is damaged or broken.  But they can also appear elsew

## 2020-11-10 ENCOUNTER — OFFICE VISIT (OUTPATIENT)
Dept: PODIATRY CLINIC | Facility: CLINIC | Age: 50
End: 2020-11-10

## 2020-11-10 VITALS
BODY MASS INDEX: 23 KG/M2 | SYSTOLIC BLOOD PRESSURE: 125 MMHG | DIASTOLIC BLOOD PRESSURE: 70 MMHG | TEMPERATURE: 98 F | WEIGHT: 148 LBS

## 2020-11-10 DIAGNOSIS — B07.0 PLANTAR WARTS: Primary | ICD-10-CM

## 2020-11-10 PROCEDURE — 3074F SYST BP LT 130 MM HG: CPT | Performed by: PODIATRIST

## 2020-11-10 PROCEDURE — 3078F DIAST BP <80 MM HG: CPT | Performed by: PODIATRIST

## 2020-11-10 PROCEDURE — 99242 OFF/OP CONSLTJ NEW/EST SF 20: CPT | Performed by: PODIATRIST

## 2020-11-10 NOTE — PROGRESS NOTES
HPI:    Patient ID: Nicole Coombs is a 44year old female. 66-year-old female presents as a new patient to me on consult from [facility] [place].  She is here because she has painful calluses in the ball of the right foot.   She states have been off and on

## 2020-11-19 NOTE — H&P
[address] [facility] [address] Gastroenterology                                                                                                  Clinic History and Physical     Pa Hip pain    • History of back pain     per NG   • History of seizure     Seizure/Epilepsy.  Rx: Tegretol; per NG   • History of torn meniscus of left knee 2012    Arthroscopy; per NG   • Lipid screening 5/10/2014    per NG   • Lower back pain    • Toya Rodriguez negative for severe shortness of breath  CARDIOVASCULAR:  negative for crushing sub-sternal chest pain  GASTROINTESTINAL:  see HPI  GENITOURINARY:  negative for dysuria or gross hematuria  INTEGUMENT/BREAST:  SKIN:  negative for jaundice   ALLERGIC/IMMUNOL procedure and all questions/concerns regarding this were addressed. The patient is amenable to proceeding.   In light of her known history of seizures, would recommend MAC sedation for procedure to be performed at the hospital.  She will follow-up in the e Prescriptions     Signed Prescriptions Disp Refills   • PEG 3350-KCl-Na Bicarb-NaCl (TRILYTE) 420 g Oral Recon Soln 1 Bottle 0     Sig: Take prep as directed by gastro office.  May substitute with Trilyte/generic equivalent if needed       [facility]

## 2020-11-23 ENCOUNTER — HOSPITAL ENCOUNTER (OUTPATIENT)
Dept: MAMMOGRAPHY | Age: 50
Discharge: HOME | End: 2020-11-23
Attending: INTERNAL MEDICINE
Payer: COMMERCIAL

## 2020-11-23 DIAGNOSIS — Z12.31 SCREENING MAMMOGRAM, ENCOUNTER FOR: ICD-10-CM

## 2020-11-23 PROCEDURE — 77067 SCR MAMMO BI INCL CAD: CPT | Performed by: INTERNAL MEDICINE

## 2020-11-23 PROCEDURE — 77063 BREAST TOMOSYNTHESIS BI: CPT | Performed by: INTERNAL MEDICINE

## 2020-12-03 ENCOUNTER — OFFICE VISIT (OUTPATIENT)
Dept: GASTROENTEROLOGY | Facility: CLINIC | Age: 50
End: 2020-12-03

## 2020-12-03 ENCOUNTER — TELEPHONE (OUTPATIENT)
Dept: GASTROENTEROLOGY | Facility: CLINIC | Age: 50
End: 2020-12-03

## 2020-12-03 VITALS
DIASTOLIC BLOOD PRESSURE: 62 MMHG | HEIGHT: 68 IN | BODY MASS INDEX: 22.43 KG/M2 | SYSTOLIC BLOOD PRESSURE: 107 MMHG | HEART RATE: 63 BPM | WEIGHT: 148 LBS

## 2020-12-03 DIAGNOSIS — Z12.11 SCREEN FOR COLON CANCER: Primary | ICD-10-CM

## 2020-12-03 DIAGNOSIS — Z12.11 SCREENING FOR COLON CANCER: Primary | ICD-10-CM

## 2020-12-03 PROCEDURE — 3078F DIAST BP <80 MM HG: CPT | Performed by: NURSE PRACTITIONER

## 2020-12-03 PROCEDURE — 3008F BODY MASS INDEX DOCD: CPT | Performed by: NURSE PRACTITIONER

## 2020-12-03 PROCEDURE — 3074F SYST BP LT 130 MM HG: CPT | Performed by: NURSE PRACTITIONER

## 2020-12-03 PROCEDURE — 99243 OFF/OP CNSLTJ NEW/EST LOW 30: CPT | Performed by: NURSE PRACTITIONER

## 2020-12-03 RX ORDER — POLYETHYLENE GLYCOL 3350, SODIUM CHLORIDE, SODIUM BICARBONATE, POTASSIUM CHLORIDE 420; 11.2; 5.72; 1.48 G/4L; G/4L; G/4L; G/4L
POWDER, FOR SOLUTION ORAL
Qty: 1 BOTTLE | Refills: 0 | Status: SHIPPED | OUTPATIENT
Start: 2020-12-03 | End: 2021-03-03

## 2020-12-03 NOTE — PATIENT INSTRUCTIONS
-Schedule colonoscopy w/ Dr. Aurea Malloy or Dr. Yordan Humphrey with MAC related to medical history  Dx: screening   -Eligible for NE: No r/t medical history (seizure disorders)  -Prep: Split dose Colyte/TriLyte or equivalent  -Anti-platelets and anti-coagulants: N

## 2020-12-03 NOTE — TELEPHONE ENCOUNTER
Scheduled for:  Colonoscopy [address]  Provider Name:  Dr. Sunil Mir  Date:  2/5/2021  Location:  [facility]  Sedation:  MAC  Time:  10:30 am, (pt is aware that [facility] will call the day before to confirm arrival time)  Prep:  Trilyte  Meds/Allergies Reconciled?:  Kr

## 2020-12-24 RX ORDER — CARBAMAZEPINE 200 MG/1
TABLET ORAL
Qty: 450 TABLET | Refills: 0 | OUTPATIENT
Start: 2020-12-24

## 2020-12-28 RX ORDER — CARBAMAZEPINE 200 MG/1
TABLET ORAL
Qty: 450 TABLET | Refills: 0 | OUTPATIENT
Start: 2020-12-28

## 2020-12-29 RX ORDER — CARBAMAZEPINE 200 MG/1
TABLET ORAL
Qty: 150 TABLET | Refills: 0 | OUTPATIENT
Start: 2020-12-29

## 2020-12-30 RX ORDER — CARBAMAZEPINE 200 MG/1
TABLET ORAL
Qty: 450 TABLET | Refills: 0 | Status: SHIPPED | OUTPATIENT
Start: 2020-12-30 | End: 2021-03-03

## 2020-12-30 NOTE — TELEPHONE ENCOUNTER
Refill request for carbamazepine 200 mg, take 2 tabs in morning and 3 tabs in evening, #450, no refills    LOV: 12/4/19  NOV: 3/3/21

## 2020-12-31 ENCOUNTER — OFFICE VISIT (OUTPATIENT)
Dept: OBGYN CLINIC | Facility: CLINIC | Age: 50
End: 2020-12-31
Payer: COMMERCIAL

## 2020-12-31 VITALS
HEART RATE: 61 BPM | SYSTOLIC BLOOD PRESSURE: 109 MMHG | HEIGHT: 67.5 IN | DIASTOLIC BLOOD PRESSURE: 68 MMHG | WEIGHT: 148 LBS | BODY MASS INDEX: 22.96 KG/M2

## 2020-12-31 DIAGNOSIS — Z01.419 ENCOUNTER FOR GYNECOLOGICAL EXAMINATION WITHOUT ABNORMAL FINDING: Primary | ICD-10-CM

## 2020-12-31 PROCEDURE — 3008F BODY MASS INDEX DOCD: CPT | Performed by: OBSTETRICS & GYNECOLOGY

## 2020-12-31 PROCEDURE — 3074F SYST BP LT 130 MM HG: CPT | Performed by: OBSTETRICS & GYNECOLOGY

## 2020-12-31 PROCEDURE — 99396 PREV VISIT EST AGE 40-64: CPT | Performed by: OBSTETRICS & GYNECOLOGY

## 2020-12-31 PROCEDURE — 3078F DIAST BP <80 MM HG: CPT | Performed by: OBSTETRICS & GYNECOLOGY

## 2020-12-31 NOTE — PROGRESS NOTES
David Connell is a 44year old female  Patient's last menstrual period was 2020. Patient presents with:  Gyn Exam: ANNUAL EXAM -- no period since given provera. No change in hx  .     OBSTETRICS HISTORY:  OB History    Para Term tobacco: Never Used      Tobacco comment: per     Substance and Sexual Activity      Alcohol use:  Yes        Alcohol/week: 1.0 - 2.0 standard drinks        Types: 1 - 2 Glasses of wine per week        Comment: 2 drinks every other month       Drug use: N Age of Onset   • Hypertension Father    • Cataracts Father    • Hypertension Mother    • Breast Cancer Mother 61        Cause of death, Metastasis; per NG   • Breast Cancer Sister         early 52's   • Diabetes Neg    • Glaucoma Neg    • Heart Disorder Ne nipple retraction or skin changes  Abdomen:   soft, nontender, nondistended, no masses  Skin/Hair:  no unusual rashes or bruises  Extremities:  no edema, no cyanosis  Psychiatric:   oriented to time, place, person and situation.  Appropriate mood and affect

## 2021-01-06 ENCOUNTER — PATIENT MESSAGE (OUTPATIENT)
Dept: GASTROENTEROLOGY | Facility: CLINIC | Age: 51
End: 2021-01-06

## 2021-01-07 NOTE — TELEPHONE ENCOUNTER
From: [employer] Beilfuss  To: Pending sale to [facility], JO ANN  Sent: 1/6/2021 7:31 PM CST  Subject: Non-Urgent Medical Question    I’m trying to confirm a date I have for colonoscopy. I’m not seeing it on my chart. I set it up for feb 5th.  Can you confirm that for

## 2021-02-02 ENCOUNTER — LAB REQUISITION (OUTPATIENT)
Dept: LAB | Facility: HOSPITAL | Age: 51
End: 2021-02-02
Payer: COMMERCIAL

## 2021-02-02 DIAGNOSIS — Z01.818 ENCOUNTER FOR OTHER PREPROCEDURAL EXAMINATION: ICD-10-CM

## 2021-02-03 LAB — SARS-COV-2 RNA RESP QL NAA+PROBE: NOT DETECTED

## 2021-02-05 ENCOUNTER — SURGERY CENTER DOCUMENTATION (OUTPATIENT)
Dept: SURGERY | Age: 51
End: 2021-02-05

## 2021-02-05 ENCOUNTER — LAB REQUISITION (OUTPATIENT)
Dept: LAB | Facility: HOSPITAL | Age: 51
End: 2021-02-05
Payer: COMMERCIAL

## 2021-02-05 DIAGNOSIS — Z12.11 ENCOUNTER FOR SCREENING FOR MALIGNANT NEOPLASM OF COLON: ICD-10-CM

## 2021-02-05 PROCEDURE — 45385 COLONOSCOPY W/LESION REMOVAL: CPT | Performed by: INTERNAL MEDICINE

## 2021-02-05 PROCEDURE — 88305 TISSUE EXAM BY PATHOLOGIST: CPT | Performed by: INTERNAL MEDICINE

## 2021-02-05 NOTE — PROCEDURES
[address] Endoscopy Report      Date of Procedure:  02/05/21      Preoperative Diagnosis:  Colorectal cancer screening      Postoperative Diagnosis:  1. Colon polyp  2.   Possible minimal diverticulosis right colon      Procedure: Polyp histology to determine recommendations regarding follow-up.         Aurelio Erickson MD  2/5/2021

## 2021-02-10 ENCOUNTER — TELEPHONE (OUTPATIENT)
Dept: GASTROENTEROLOGY | Facility: CLINIC | Age: 51
End: 2021-02-10

## 2021-02-10 NOTE — TELEPHONE ENCOUNTER
Meliza Romo MD  [facility] Clinical Staff             As per SHAD parameters I left a message on the patient's personal cell phone voicemail. Joie Hall had a solitary tubular adenoma removed that measured 8 mm in size histologically.  I discussed the sign

## 2021-02-10 NOTE — TELEPHONE ENCOUNTER
Health maintenance updated. 5 year colonoscopy recall placed in patient outreach. Next due on 02/05/2026 per Dr. Skye Heredia.

## 2021-03-03 ENCOUNTER — TELEPHONE (OUTPATIENT)
Dept: NEUROLOGY | Facility: CLINIC | Age: 51
End: 2021-03-03

## 2021-03-03 ENCOUNTER — LAB ENCOUNTER (OUTPATIENT)
Dept: LAB | Facility: HOSPITAL | Age: 51
End: 2021-03-03
Attending: Other
Payer: COMMERCIAL

## 2021-03-03 ENCOUNTER — OFFICE VISIT (OUTPATIENT)
Dept: NEUROLOGY | Facility: CLINIC | Age: 51
End: 2021-03-03
Payer: COMMERCIAL

## 2021-03-03 VITALS
WEIGHT: 145 LBS | SYSTOLIC BLOOD PRESSURE: 112 MMHG | DIASTOLIC BLOOD PRESSURE: 72 MMHG | HEIGHT: 67.5 IN | BODY MASS INDEX: 22.49 KG/M2

## 2021-03-03 DIAGNOSIS — R56.9 SEIZURE (HCC): Primary | ICD-10-CM

## 2021-03-03 DIAGNOSIS — R56.9 SEIZURE (HCC): ICD-10-CM

## 2021-03-03 LAB
ALT SERPL-CCNC: 26 U/L
AST SERPL-CCNC: 18 U/L (ref 15–37)
BASOPHILS # BLD AUTO: 0.04 X10(3) UL (ref 0–0.2)
BASOPHILS NFR BLD AUTO: 1.2 %
DEPRECATED RDW RBC AUTO: 36.9 FL (ref 35.1–46.3)
EOSINOPHIL # BLD AUTO: 0.15 X10(3) UL (ref 0–0.7)
EOSINOPHIL NFR BLD AUTO: 4.4 %
ERYTHROCYTE [DISTWIDTH] IN BLOOD BY AUTOMATED COUNT: 11.7 % (ref 11–15)
HCT VFR BLD AUTO: 38.1 %
HGB BLD-MCNC: 12.7 G/DL
IMM GRANULOCYTES # BLD AUTO: 0 X10(3) UL (ref 0–1)
IMM GRANULOCYTES NFR BLD: 0 %
LYMPHOCYTES # BLD AUTO: 1.2 X10(3) UL (ref 1–4)
LYMPHOCYTES NFR BLD AUTO: 35.5 %
MCH RBC QN AUTO: 28.5 PG (ref 26–34)
MCHC RBC AUTO-ENTMCNC: 33.3 G/DL (ref 31–37)
MCV RBC AUTO: 85.6 FL
MONOCYTES # BLD AUTO: 0.37 X10(3) UL (ref 0.1–1)
MONOCYTES NFR BLD AUTO: 10.9 %
NEUTROPHILS # BLD AUTO: 1.62 X10 (3) UL (ref 1.5–7.7)
NEUTROPHILS # BLD AUTO: 1.62 X10(3) UL (ref 1.5–7.7)
NEUTROPHILS NFR BLD AUTO: 48 %
PLATELET # BLD AUTO: 272 10(3)UL (ref 150–450)
RBC # BLD AUTO: 4.45 X10(6)UL
WBC # BLD AUTO: 3.4 X10(3) UL (ref 4–11)

## 2021-03-03 PROCEDURE — 36415 COLL VENOUS BLD VENIPUNCTURE: CPT | Performed by: OTHER

## 2021-03-03 PROCEDURE — 84450 TRANSFERASE (AST) (SGOT): CPT

## 2021-03-03 PROCEDURE — 3008F BODY MASS INDEX DOCD: CPT | Performed by: OTHER

## 2021-03-03 PROCEDURE — 84460 ALANINE AMINO (ALT) (SGPT): CPT

## 2021-03-03 PROCEDURE — 85025 COMPLETE CBC W/AUTO DIFF WBC: CPT | Performed by: OTHER

## 2021-03-03 PROCEDURE — 99214 OFFICE O/P EST MOD 30 MIN: CPT | Performed by: OTHER

## 2021-03-03 PROCEDURE — 3074F SYST BP LT 130 MM HG: CPT | Performed by: OTHER

## 2021-03-03 PROCEDURE — 3078F DIAST BP <80 MM HG: CPT | Performed by: OTHER

## 2021-03-03 RX ORDER — CARBAMAZEPINE 200 MG/1
TABLET ORAL
Qty: 450 TABLET | Refills: 3 | Status: SHIPPED | OUTPATIENT
Start: 2021-03-03 | End: 2021-03-31

## 2021-03-03 NOTE — PROGRESS NOTES
Ms. Broussard Felt, has had no seizures in the last year. She has residual right distal S1 numbness from lumbar disc surgery. She never had physical therapy after surgery. Suggest that she go downstairs to physical therapy, recommended Joseph Holder.   She has a

## 2021-03-03 NOTE — TELEPHONE ENCOUNTER
Spoke to patient and notified her of below. She was understanding. She requested order to be sent to her so she can remember to get it done in 3 months. Future order placed for CBC and mailed to patient's home address.

## 2021-03-03 NOTE — TELEPHONE ENCOUNTER
Please call the patient tell her CBC today revealed mild decrease in WBC. Please tell her and recommend repeating this in 3 months. Please asked her to call the office in 3 months for to be placed.   Thank you

## 2021-03-31 DIAGNOSIS — R56.9 SEIZURE (HCC): Primary | ICD-10-CM

## 2021-03-31 RX ORDER — CARBAMAZEPINE 200 MG/1
TABLET ORAL
Qty: 450 TABLET | Refills: 3 | Status: SHIPPED | OUTPATIENT
Start: 2021-03-31

## 2021-03-31 NOTE — TELEPHONE ENCOUNTER
Refill request for carBAMazepine (TEGRETOL) 200 MG Oral Tab, TAKE 2 TABLETS BY MOUTH IN THE MORNING AND TAKE 3 TABLETS IN THE EVENING, 450 Tablets with 3 Refills    LOV: 3/3/21  NOV: None      Received fax from [facility] that patient wanted prescription fill

## 2021-09-30 PROBLEM — M25.562 CHRONIC PAIN OF LEFT KNEE: Status: ACTIVE | Noted: 2021-09-30

## 2021-09-30 PROBLEM — G89.29 CHRONIC PAIN OF LEFT KNEE: Status: ACTIVE | Noted: 2021-09-30

## 2022-03-24 RX ORDER — CARBAMAZEPINE 200 MG/1
TABLET ORAL
Qty: 450 TABLET | Refills: 0 | Status: SHIPPED | OUTPATIENT
Start: 2022-03-24

## 2022-03-24 NOTE — TELEPHONE ENCOUNTER
Refill request for carbamazepine 200 mg, take 2 tabs in morning and 3 tabs in evening, #450, no refills    LOV: 3/3/21  NOV: none  Last refilled on 3/31/21 for 1 year supply

## 2022-06-16 DIAGNOSIS — R56.9 SEIZURE (HCC): ICD-10-CM

## 2022-06-17 RX ORDER — CARBAMAZEPINE 200 MG/1
TABLET ORAL
Qty: 150 TABLET | Refills: 0 | Status: SHIPPED | OUTPATIENT
Start: 2022-06-17

## 2022-06-17 NOTE — TELEPHONE ENCOUNTER
Refill request for carbamazepine 200 mg, take 2 tabs in morning and 3 tabs in evening, #150, no refills    LOV: 3/3/21  NOV: None  mychart message left to schedule appointment

## 2022-06-20 ENCOUNTER — OFFICE VISIT (OUTPATIENT)
Dept: OBGYN CLINIC | Facility: CLINIC | Age: 52
End: 2022-06-20
Payer: COMMERCIAL

## 2022-06-20 VITALS
HEIGHT: 67.4 IN | BODY MASS INDEX: 23.89 KG/M2 | DIASTOLIC BLOOD PRESSURE: 70 MMHG | WEIGHT: 154 LBS | HEART RATE: 63 BPM | SYSTOLIC BLOOD PRESSURE: 108 MMHG

## 2022-06-20 DIAGNOSIS — Z01.411 ENCOUNTER FOR GYNECOLOGICAL EXAMINATION WITH ABNORMAL FINDING: Primary | ICD-10-CM

## 2022-06-20 DIAGNOSIS — N95.0 POSTMENOPAUSAL BLEEDING: ICD-10-CM

## 2022-06-20 DIAGNOSIS — Z12.31 VISIT FOR SCREENING MAMMOGRAM: ICD-10-CM

## 2022-06-20 PROCEDURE — 87624 HPV HI-RISK TYP POOLED RSLT: CPT | Performed by: OBSTETRICS & GYNECOLOGY

## 2022-06-21 LAB — HPV I/H RISK 1 DNA SPEC QL NAA+PROBE: NEGATIVE

## 2022-06-29 LAB — LAST PAP RESULT: NORMAL

## 2022-07-17 DIAGNOSIS — R56.9 SEIZURE (HCC): ICD-10-CM

## 2022-07-18 RX ORDER — CARBAMAZEPINE 200 MG/1
TABLET ORAL
Qty: 150 TABLET | Refills: 0 | Status: SHIPPED | OUTPATIENT
Start: 2022-07-18

## 2022-07-18 NOTE — TELEPHONE ENCOUNTER
Dr. Jo Colbert patient   Refill request for carbamazepine 200 mg, take 2 tabs in morning and 3 tabs in evening, #150, no refills    LOV: 3/3/21  NOV: none

## 2022-07-22 ENCOUNTER — HOSPITAL ENCOUNTER (OUTPATIENT)
Dept: MAMMOGRAPHY | Facility: HOSPITAL | Age: 52
Discharge: HOME | End: 2022-07-22
Attending: OBSTETRICS & GYNECOLOGY
Payer: COMMERCIAL

## 2022-07-22 ENCOUNTER — HOSPITAL ENCOUNTER (OUTPATIENT)
Dept: ULTRASOUND IMAGING | Facility: HOSPITAL | Age: 52
Discharge: HOME | End: 2022-07-22
Attending: OBSTETRICS & GYNECOLOGY
Payer: COMMERCIAL

## 2022-07-22 DIAGNOSIS — N95.0 POSTMENOPAUSAL BLEEDING: ICD-10-CM

## 2022-07-22 DIAGNOSIS — Z12.31 VISIT FOR SCREENING MAMMOGRAM: ICD-10-CM

## 2022-07-22 PROCEDURE — 77067 SCR MAMMO BI INCL CAD: CPT | Performed by: OBSTETRICS & GYNECOLOGY

## 2022-07-22 PROCEDURE — 77063 BREAST TOMOSYNTHESIS BI: CPT | Performed by: OBSTETRICS & GYNECOLOGY

## 2022-07-22 PROCEDURE — 76830 TRANSVAGINAL US NON-OB: CPT | Performed by: OBSTETRICS & GYNECOLOGY

## 2022-07-22 PROCEDURE — 76856 US EXAM PELVIC COMPLETE: CPT | Performed by: OBSTETRICS & GYNECOLOGY

## 2022-08-04 ENCOUNTER — OFFICE VISIT (OUTPATIENT)
Dept: NEUROLOGY | Facility: CLINIC | Age: 52
End: 2022-08-04
Payer: COMMERCIAL

## 2022-08-04 ENCOUNTER — LAB ENCOUNTER (OUTPATIENT)
Dept: LAB | Facility: HOSPITAL | Age: 52
End: 2022-08-04
Attending: Other
Payer: COMMERCIAL

## 2022-08-04 VITALS
HEART RATE: 64 BPM | HEIGHT: 67.5 IN | SYSTOLIC BLOOD PRESSURE: 124 MMHG | BODY MASS INDEX: 23.89 KG/M2 | WEIGHT: 154 LBS | DIASTOLIC BLOOD PRESSURE: 68 MMHG

## 2022-08-04 DIAGNOSIS — R56.9 SEIZURE (HCC): ICD-10-CM

## 2022-08-04 LAB
ALT SERPL-CCNC: 33 U/L
AST SERPL-CCNC: 25 U/L (ref 15–37)
BASOPHILS # BLD AUTO: 0.04 X10(3) UL (ref 0–0.2)
BASOPHILS NFR BLD AUTO: 0.7 %
DEPRECATED RDW RBC AUTO: 38.3 FL (ref 35.1–46.3)
EOSINOPHIL # BLD AUTO: 0.17 X10(3) UL (ref 0–0.7)
EOSINOPHIL NFR BLD AUTO: 3 %
ERYTHROCYTE [DISTWIDTH] IN BLOOD BY AUTOMATED COUNT: 11.8 % (ref 11–15)
HCT VFR BLD AUTO: 37.6 %
HGB BLD-MCNC: 12.4 G/DL
IMM GRANULOCYTES # BLD AUTO: 0.01 X10(3) UL (ref 0–1)
IMM GRANULOCYTES NFR BLD: 0.2 %
LYMPHOCYTES # BLD AUTO: 1.44 X10(3) UL (ref 1–4)
LYMPHOCYTES NFR BLD AUTO: 25.5 %
MCH RBC QN AUTO: 28.8 PG (ref 26–34)
MCHC RBC AUTO-ENTMCNC: 33 G/DL (ref 31–37)
MCV RBC AUTO: 87.2 FL
MONOCYTES # BLD AUTO: 0.53 X10(3) UL (ref 0.1–1)
MONOCYTES NFR BLD AUTO: 9.4 %
NEUTROPHILS # BLD AUTO: 3.46 X10 (3) UL (ref 1.5–7.7)
NEUTROPHILS # BLD AUTO: 3.46 X10(3) UL (ref 1.5–7.7)
NEUTROPHILS NFR BLD AUTO: 61.2 %
PLATELET # BLD AUTO: 276 10(3)UL (ref 150–450)
RBC # BLD AUTO: 4.31 X10(6)UL
WBC # BLD AUTO: 5.7 X10(3) UL (ref 4–11)

## 2022-08-04 PROCEDURE — 85025 COMPLETE CBC W/AUTO DIFF WBC: CPT | Performed by: OTHER

## 2022-08-04 PROCEDURE — 84460 ALANINE AMINO (ALT) (SGPT): CPT

## 2022-08-04 PROCEDURE — 36415 COLL VENOUS BLD VENIPUNCTURE: CPT

## 2022-08-04 PROCEDURE — 3074F SYST BP LT 130 MM HG: CPT | Performed by: OTHER

## 2022-08-04 PROCEDURE — 3078F DIAST BP <80 MM HG: CPT | Performed by: OTHER

## 2022-08-04 PROCEDURE — 3008F BODY MASS INDEX DOCD: CPT | Performed by: OTHER

## 2022-08-04 PROCEDURE — 99213 OFFICE O/P EST LOW 20 MIN: CPT | Performed by: OTHER

## 2022-08-04 PROCEDURE — 84450 TRANSFERASE (AST) (SGOT): CPT

## 2022-08-04 RX ORDER — CARBAMAZEPINE 200 MG/1
TABLET ORAL
Qty: 150 TABLET | Refills: 0 | Status: SHIPPED | OUTPATIENT
Start: 2022-08-04

## 2022-08-11 NOTE — PROGRESS NOTES
See call dated yesterday 08/10/2022.   Tawnya Aguirre is a 46year old female.   Patient presents with:  Muscle Pain: right upper leg       HPI:   Pt comes as an urgent visit   C/c right leg pulled muslce   C/o above x2 weeks   Tried icy hot   Falls trauma injury that she is aware of  Lips Ovarian Cystectomy; per NG      Social History:  Social History    Tobacco Use      Smoking status: Never Smoker      Smokeless tobacco: Never Used      Tobacco comment: per NG    Alcohol use:  Yes      Alcohol/week: 0.6 - 1.2 oz      Types: 1 - 2 Glasses o CADY BEN 3D ONLY ADDL VWS CADY BILAT (IZT=10461); Future    Dense breasts  -     CADY BEN 3D ONLY ADDL VWS CADY BILAT (LIE=58317); Future    Other orders  -     Cancel: CADY BEN 2D+3D SCREENING BILAT (CPT=77080/38115);  Future        The patient indicates und

## 2022-09-07 DIAGNOSIS — R56.9 SEIZURE (HCC): ICD-10-CM

## 2022-09-08 RX ORDER — CARBAMAZEPINE 200 MG/1
TABLET ORAL
Qty: 450 TABLET | Refills: 3 | Status: SHIPPED | OUTPATIENT
Start: 2022-09-08

## 2022-09-08 NOTE — TELEPHONE ENCOUNTER
Refill request for carbamazepine 200 mg, take 2 tabs in morning and 3 tabs in evening, #450, 3 refills    LOV: 8/4/22  NOV: none

## 2022-12-15 ENCOUNTER — TELEPHONE (OUTPATIENT)
Dept: INTERNAL MEDICINE CLINIC | Facility: CLINIC | Age: 52
End: 2022-12-15

## 2022-12-15 NOTE — TELEPHONE ENCOUNTER
Please assist patient with an appointment for a pre-op, patient will be having surgery on 2/8/23  Please schedule patient after 1/8/22

## 2023-01-24 ENCOUNTER — OFFICE VISIT (OUTPATIENT)
Dept: INTERNAL MEDICINE CLINIC | Facility: CLINIC | Age: 53
End: 2023-01-24

## 2023-01-24 VITALS
HEIGHT: 67.5 IN | DIASTOLIC BLOOD PRESSURE: 60 MMHG | WEIGHT: 158 LBS | OXYGEN SATURATION: 99 % | SYSTOLIC BLOOD PRESSURE: 118 MMHG | BODY MASS INDEX: 24.51 KG/M2 | RESPIRATION RATE: 16 BRPM | HEART RATE: 63 BPM

## 2023-01-24 DIAGNOSIS — Z01.818 PREOP EXAMINATION: Primary | ICD-10-CM

## 2023-01-24 DIAGNOSIS — B07.0 PLANTAR WART: ICD-10-CM

## 2023-01-24 DIAGNOSIS — Z12.83 SKIN CANCER SCREENING: ICD-10-CM

## 2023-01-24 DIAGNOSIS — M17.12 PRIMARY OSTEOARTHRITIS OF LEFT KNEE: ICD-10-CM

## 2023-01-24 PROCEDURE — 90471 IMMUNIZATION ADMIN: CPT | Performed by: INTERNAL MEDICINE

## 2023-01-24 PROCEDURE — 3008F BODY MASS INDEX DOCD: CPT | Performed by: INTERNAL MEDICINE

## 2023-01-24 PROCEDURE — 90686 IIV4 VACC NO PRSV 0.5 ML IM: CPT | Performed by: INTERNAL MEDICINE

## 2023-01-24 PROCEDURE — 3078F DIAST BP <80 MM HG: CPT | Performed by: INTERNAL MEDICINE

## 2023-01-24 PROCEDURE — 3074F SYST BP LT 130 MM HG: CPT | Performed by: INTERNAL MEDICINE

## 2023-01-24 PROCEDURE — 99243 OFF/OP CNSLTJ NEW/EST LOW 30: CPT | Performed by: INTERNAL MEDICINE

## 2023-01-28 ENCOUNTER — EKG ENCOUNTER (OUTPATIENT)
Dept: LAB | Facility: HOSPITAL | Age: 53
End: 2023-01-28
Attending: ORTHOPAEDIC SURGERY
Payer: COMMERCIAL

## 2023-01-28 ENCOUNTER — HOSPITAL ENCOUNTER (OUTPATIENT)
Dept: GENERAL RADIOLOGY | Facility: HOSPITAL | Age: 53
Discharge: HOME | End: 2023-01-28
Attending: ORTHOPAEDIC SURGERY
Payer: COMMERCIAL

## 2023-01-28 DIAGNOSIS — Z01.818 PRE-OP TESTING: ICD-10-CM

## 2023-01-28 LAB
ALBUMIN SERPL-MCNC: 3.9 G/DL (ref 3.4–5)
ALBUMIN/GLOB SERPL: 1.2 {RATIO} (ref 1–2)
ALP LIVER SERPL-CCNC: 101 U/L
ALT SERPL-CCNC: 39 U/L
ANION GAP SERPL CALC-SCNC: 3 MMOL/L (ref 0–18)
ANTIBODY SCREEN: NEGATIVE
APTT PPP: 28.2 SECONDS (ref 23.3–35.6)
AST SERPL-CCNC: 31 U/L (ref 15–37)
ATRIAL RATE: 64 BPM
BASOPHILS # BLD AUTO: 0.04 X10(3) UL (ref 0–0.2)
BASOPHILS NFR BLD AUTO: 1.1 %
BILIRUB SERPL-MCNC: 0.4 MG/DL (ref 0.1–2)
BILIRUB UR QL: NEGATIVE
BUN BLD-MCNC: 11 MG/DL (ref 7–18)
BUN/CREAT SERPL: 15.9 (ref 10–20)
CALCIUM BLD-MCNC: 9.1 MG/DL (ref 8.5–10.1)
CHLORIDE SERPL-SCNC: 105 MMOL/L (ref 98–112)
CLARITY UR: CLEAR
CO2 SERPL-SCNC: 31 MMOL/L (ref 21–32)
COLOR UR: YELLOW
CREAT BLD-MCNC: 0.69 MG/DL
DEPRECATED RDW RBC AUTO: 39.1 FL (ref 35.1–46.3)
EOSINOPHIL # BLD AUTO: 0.19 X10(3) UL (ref 0–0.7)
EOSINOPHIL NFR BLD AUTO: 5.3 %
ERYTHROCYTE [DISTWIDTH] IN BLOOD BY AUTOMATED COUNT: 12.1 % (ref 11–15)
FASTING STATUS PATIENT QL REPORTED: NO
GFR SERPLBLD BASED ON 1.73 SQ M-ARVRAT: 103 ML/MIN/1.73M2 (ref 60–?)
GLOBULIN PLAS-MCNC: 3.3 G/DL (ref 2.8–4.4)
GLUCOSE BLD-MCNC: 80 MG/DL (ref 70–99)
GLUCOSE UR-MCNC: NEGATIVE MG/DL
HCT VFR BLD AUTO: 38.3 %
HGB BLD-MCNC: 12.5 G/DL
HGB UR QL STRIP.AUTO: NEGATIVE
IMM GRANULOCYTES # BLD AUTO: 0.01 X10(3) UL (ref 0–1)
IMM GRANULOCYTES NFR BLD: 0.3 %
INR BLD: 0.98 (ref 0.85–1.16)
KETONES UR-MCNC: NEGATIVE MG/DL
LYMPHOCYTES # BLD AUTO: 0.88 X10(3) UL (ref 1–4)
LYMPHOCYTES NFR BLD AUTO: 24.6 %
MCH RBC QN AUTO: 28.5 PG (ref 26–34)
MCHC RBC AUTO-ENTMCNC: 32.6 G/DL (ref 31–37)
MCV RBC AUTO: 87.2 FL
MONOCYTES # BLD AUTO: 0.34 X10(3) UL (ref 0.1–1)
MONOCYTES NFR BLD AUTO: 9.5 %
MRSA DNA SPEC QL NAA+PROBE: NEGATIVE
NEUTROPHILS # BLD AUTO: 2.12 X10 (3) UL (ref 1.5–7.7)
NEUTROPHILS # BLD AUTO: 2.12 X10(3) UL (ref 1.5–7.7)
NEUTROPHILS NFR BLD AUTO: 59.2 %
NITRITE UR QL STRIP.AUTO: NEGATIVE
OSMOLALITY SERPL CALC.SUM OF ELEC: 286 MOSM/KG (ref 275–295)
P AXIS: 53 DEGREES
P-R INTERVAL: 156 MS
PH UR: 6 [PH] (ref 5–8)
PLATELET # BLD AUTO: 294 10(3)UL (ref 150–450)
POTASSIUM SERPL-SCNC: 4.1 MMOL/L (ref 3.5–5.1)
PROT SERPL-MCNC: 7.2 G/DL (ref 6.4–8.2)
PROT UR-MCNC: NEGATIVE MG/DL
PROTHROMBIN TIME: 13 SECONDS (ref 11.6–14.8)
Q-T INTERVAL: 410 MS
QRS DURATION: 96 MS
QTC CALCULATION (BEZET): 422 MS
R AXIS: 90 DEGREES
RBC # BLD AUTO: 4.39 X10(6)UL
RH BLOOD TYPE: POSITIVE
RH BLOOD TYPE: POSITIVE
SODIUM SERPL-SCNC: 139 MMOL/L (ref 136–145)
SP GR UR STRIP: 1.02 (ref 1–1.03)
T AXIS: 35 DEGREES
UROBILINOGEN UR STRIP-ACNC: 0.2
VENTRICULAR RATE: 64 BPM
WBC # BLD AUTO: 3.6 X10(3) UL (ref 4–11)

## 2023-01-28 PROCEDURE — 85610 PROTHROMBIN TIME: CPT

## 2023-01-28 PROCEDURE — 87641 MR-STAPH DNA AMP PROBE: CPT

## 2023-01-28 PROCEDURE — 93005 ELECTROCARDIOGRAM TRACING: CPT

## 2023-01-28 PROCEDURE — 86850 RBC ANTIBODY SCREEN: CPT

## 2023-01-28 PROCEDURE — 87086 URINE CULTURE/COLONY COUNT: CPT

## 2023-01-28 PROCEDURE — 80053 COMPREHEN METABOLIC PANEL: CPT

## 2023-01-28 PROCEDURE — 36415 COLL VENOUS BLD VENIPUNCTURE: CPT

## 2023-01-28 PROCEDURE — 81015 MICROSCOPIC EXAM OF URINE: CPT

## 2023-01-28 PROCEDURE — 85730 THROMBOPLASTIN TIME PARTIAL: CPT

## 2023-01-28 PROCEDURE — 86901 BLOOD TYPING SEROLOGIC RH(D): CPT

## 2023-01-28 PROCEDURE — 85025 COMPLETE CBC W/AUTO DIFF WBC: CPT

## 2023-01-28 PROCEDURE — 81001 URINALYSIS AUTO W/SCOPE: CPT

## 2023-01-28 PROCEDURE — 93010 ELECTROCARDIOGRAM REPORT: CPT | Performed by: INTERNAL MEDICINE

## 2023-01-28 PROCEDURE — 71046 X-RAY EXAM CHEST 2 VIEWS: CPT | Performed by: ORTHOPAEDIC SURGERY

## 2023-01-28 PROCEDURE — 86900 BLOOD TYPING SEROLOGIC ABO: CPT

## 2023-02-05 ENCOUNTER — LAB ENCOUNTER (OUTPATIENT)
Dept: LAB | Facility: HOSPITAL | Age: 53
End: 2023-02-05
Attending: ORTHOPAEDIC SURGERY
Payer: COMMERCIAL

## 2023-02-05 DIAGNOSIS — Z01.818 PREOP TESTING: ICD-10-CM

## 2023-02-06 LAB — SARS-COV-2 RNA RESP QL NAA+PROBE: NOT DETECTED

## 2023-02-06 NOTE — CM/SW NOTE
SW was notified by Annemarie Alfaro they were pre-operatively arranged with the pt. After surgery DSC to send referral in Aidin. Juju Romeo orders and face to face will also need to be entered post op.     [facility] ext [phone]

## 2023-02-08 ENCOUNTER — APPOINTMENT (OUTPATIENT)
Dept: GENERAL RADIOLOGY | Facility: HOSPITAL | Age: 53
End: 2023-02-08
Attending: ORTHOPAEDIC SURGERY
Payer: COMMERCIAL

## 2023-02-08 ENCOUNTER — ANESTHESIA (OUTPATIENT)
Dept: SURGERY | Facility: HOSPITAL | Age: 53
End: 2023-02-08
Payer: COMMERCIAL

## 2023-02-08 ENCOUNTER — HOSPITAL ENCOUNTER (OUTPATIENT)
Facility: HOSPITAL | Age: 53
Discharge: HOME HEALTH | End: 2023-02-11
Attending: ORTHOPAEDIC SURGERY | Admitting: ORTHOPAEDIC SURGERY
Payer: COMMERCIAL

## 2023-02-08 ENCOUNTER — ANESTHESIA EVENT (OUTPATIENT)
Dept: SURGERY | Facility: HOSPITAL | Age: 53
End: 2023-02-08
Payer: COMMERCIAL

## 2023-02-08 DIAGNOSIS — Z01.818 PREOP TESTING: Primary | ICD-10-CM

## 2023-02-08 LAB — B-HCG UR QL: NEGATIVE

## 2023-02-08 PROCEDURE — 73560 X-RAY EXAM OF KNEE 1 OR 2: CPT | Performed by: ORTHOPAEDIC SURGERY

## 2023-02-08 PROCEDURE — 81025 URINE PREGNANCY TEST: CPT

## 2023-02-08 PROCEDURE — 97161 PT EVAL LOW COMPLEX 20 MIN: CPT

## 2023-02-08 PROCEDURE — 97116 GAIT TRAINING THERAPY: CPT

## 2023-02-08 PROCEDURE — 94799 UNLISTED PULMONARY SVC/PX: CPT

## 2023-02-08 PROCEDURE — 0SRD0J9 REPLACEMENT OF LEFT KNEE JOINT WITH SYNTHETIC SUBSTITUTE, CEMENTED, OPEN APPROACH: ICD-10-PCS | Performed by: ORTHOPAEDIC SURGERY

## 2023-02-08 PROCEDURE — 88311 DECALCIFY TISSUE: CPT | Performed by: ORTHOPAEDIC SURGERY

## 2023-02-08 PROCEDURE — 88305 TISSUE EXAM BY PATHOLOGIST: CPT | Performed by: ORTHOPAEDIC SURGERY

## 2023-02-08 DEVICE — ADVANCE® ONLAY ALL-POLY PATELLA 38MM TRI-PEG
Type: IMPLANTABLE DEVICE | Site: KNEE | Status: FUNCTIONAL
Brand: ADVANCE®

## 2023-02-08 DEVICE — IMPLANTABLE DEVICE
Type: IMPLANTABLE DEVICE | Site: KNEE | Status: FUNCTIONAL
Brand: BIOMET® BONE CEMENT R

## 2023-02-08 DEVICE — EVOLUTION® MP TIB KEELED NONPOR SIZE 6 STANDARD LEFT
Type: IMPLANTABLE DEVICE | Site: KNEE | Status: FUNCTIONAL
Brand: EVOLUTION

## 2023-02-08 DEVICE — EVOLUTION® MP™ CS INSERT SIZE 6 STANDARD 14MM LEFT
Type: IMPLANTABLE DEVICE | Site: KNEE | Status: FUNCTIONAL
Brand: EVOLUTION

## 2023-02-08 DEVICE — EVOLUTION®MP FEM CS/CR NON-POR SIZE 6 PRIMARY LEFT
Type: IMPLANTABLE DEVICE | Site: KNEE | Status: FUNCTIONAL
Brand: EVOLUTION

## 2023-02-08 RX ORDER — CEFAZOLIN SODIUM/WATER 2 G/20 ML
2 SYRINGE (ML) INTRAVENOUS EVERY 8 HOURS
Status: COMPLETED | OUTPATIENT
Start: 2023-02-08 | End: 2023-02-09

## 2023-02-08 RX ORDER — CYCLOBENZAPRINE HCL 10 MG
10 TABLET ORAL EVERY 8 HOURS PRN
Status: DISCONTINUED | OUTPATIENT
Start: 2023-02-08 | End: 2023-02-11

## 2023-02-08 RX ORDER — ASPIRIN 325 MG
325 TABLET, DELAYED RELEASE (ENTERIC COATED) ORAL EVERY 12 HOURS
Status: DISCONTINUED | OUTPATIENT
Start: 2023-02-08 | End: 2023-02-11

## 2023-02-08 RX ORDER — HYDROMORPHONE HYDROCHLORIDE 1 MG/ML
0.4 INJECTION, SOLUTION INTRAMUSCULAR; INTRAVENOUS; SUBCUTANEOUS
Status: ACTIVE | OUTPATIENT
Start: 2023-02-08 | End: 2023-02-09

## 2023-02-08 RX ORDER — EPHEDRINE SULFATE 50 MG/ML
INJECTION INTRAVENOUS AS NEEDED
Status: DISCONTINUED | OUTPATIENT
Start: 2023-02-08 | End: 2023-02-08 | Stop reason: SURG

## 2023-02-08 RX ORDER — HYDROMORPHONE HYDROCHLORIDE 1 MG/ML
0.6 INJECTION, SOLUTION INTRAMUSCULAR; INTRAVENOUS; SUBCUTANEOUS EVERY 5 MIN PRN
Status: DISCONTINUED | OUTPATIENT
Start: 2023-02-08 | End: 2023-02-08 | Stop reason: HOSPADM

## 2023-02-08 RX ORDER — SODIUM CHLORIDE, SODIUM LACTATE, POTASSIUM CHLORIDE, CALCIUM CHLORIDE 600; 310; 30; 20 MG/100ML; MG/100ML; MG/100ML; MG/100ML
INJECTION, SOLUTION INTRAVENOUS CONTINUOUS
Status: DISCONTINUED | OUTPATIENT
Start: 2023-02-08 | End: 2023-02-11

## 2023-02-08 RX ORDER — HYDROCODONE BITARTRATE AND ACETAMINOPHEN 7.5; 325 MG/1; MG/1
1 TABLET ORAL EVERY 6 HOURS PRN
Status: DISPENSED | OUTPATIENT
Start: 2023-02-08 | End: 2023-02-09

## 2023-02-08 RX ORDER — ACETAMINOPHEN 325 MG/1
650 TABLET ORAL EVERY 6 HOURS PRN
Status: ACTIVE | OUTPATIENT
Start: 2023-02-08 | End: 2023-02-09

## 2023-02-08 RX ORDER — PROCHLORPERAZINE EDISYLATE 5 MG/ML
5 INJECTION INTRAMUSCULAR; INTRAVENOUS ONCE AS NEEDED
Status: ACTIVE | OUTPATIENT
Start: 2023-02-08 | End: 2023-02-08

## 2023-02-08 RX ORDER — DIPHENHYDRAMINE HYDROCHLORIDE 50 MG/ML
12.5 INJECTION INTRAMUSCULAR; INTRAVENOUS EVERY 4 HOURS PRN
Status: ACTIVE | OUTPATIENT
Start: 2023-02-08 | End: 2023-02-09

## 2023-02-08 RX ORDER — DIPHENHYDRAMINE HYDROCHLORIDE 50 MG/ML
12.5 INJECTION INTRAMUSCULAR; INTRAVENOUS EVERY 4 HOURS PRN
Status: DISCONTINUED | OUTPATIENT
Start: 2023-02-08 | End: 2023-02-11

## 2023-02-08 RX ORDER — OXYCODONE AND ACETAMINOPHEN 7.5; 325 MG/1; MG/1
1 TABLET ORAL EVERY 4 HOURS PRN
Status: DISCONTINUED | OUTPATIENT
Start: 2023-02-08 | End: 2023-02-11

## 2023-02-08 RX ORDER — DIPHENHYDRAMINE HYDROCHLORIDE 50 MG/ML
25 INJECTION INTRAMUSCULAR; INTRAVENOUS ONCE AS NEEDED
Status: ACTIVE | OUTPATIENT
Start: 2023-02-08 | End: 2023-02-08

## 2023-02-08 RX ORDER — NALOXONE HYDROCHLORIDE 0.4 MG/ML
80 INJECTION, SOLUTION INTRAMUSCULAR; INTRAVENOUS; SUBCUTANEOUS AS NEEDED
Status: DISCONTINUED | OUTPATIENT
Start: 2023-02-08 | End: 2023-02-08 | Stop reason: HOSPADM

## 2023-02-08 RX ORDER — CARBAMAZEPINE 200 MG/1
600 TABLET ORAL NIGHTLY
Status: DISCONTINUED | OUTPATIENT
Start: 2023-02-08 | End: 2023-02-11

## 2023-02-08 RX ORDER — NALBUPHINE HCL 10 MG/ML
2.5 AMPUL (ML) INJECTION EVERY 4 HOURS PRN
Status: ACTIVE | OUTPATIENT
Start: 2023-02-08 | End: 2023-02-09

## 2023-02-08 RX ORDER — DIPHENHYDRAMINE HCL 25 MG
25 CAPSULE ORAL EVERY 4 HOURS PRN
Status: DISCONTINUED | OUTPATIENT
Start: 2023-02-08 | End: 2023-02-11

## 2023-02-08 RX ORDER — HYDROMORPHONE HYDROCHLORIDE 1 MG/ML
0.4 INJECTION, SOLUTION INTRAMUSCULAR; INTRAVENOUS; SUBCUTANEOUS EVERY 2 HOUR PRN
Status: DISCONTINUED | OUTPATIENT
Start: 2023-02-08 | End: 2023-02-11

## 2023-02-08 RX ORDER — ONDANSETRON 2 MG/ML
4 INJECTION INTRAMUSCULAR; INTRAVENOUS ONCE AS NEEDED
Status: ACTIVE | OUTPATIENT
Start: 2023-02-08 | End: 2023-02-08

## 2023-02-08 RX ORDER — CARBAMAZEPINE 200 MG/1
200 TABLET ORAL 3 TIMES DAILY
Status: DISCONTINUED | OUTPATIENT
Start: 2023-02-08 | End: 2023-02-08

## 2023-02-08 RX ORDER — SENNOSIDES 8.6 MG
17.2 TABLET ORAL NIGHTLY
Status: DISCONTINUED | OUTPATIENT
Start: 2023-02-08 | End: 2023-02-11

## 2023-02-08 RX ORDER — MORPHINE SULFATE 1 MG/ML
INJECTION, SOLUTION EPIDURAL; INTRATHECAL; INTRAVENOUS AS NEEDED
Status: DISCONTINUED | OUTPATIENT
Start: 2023-02-08 | End: 2023-02-08 | Stop reason: SURG

## 2023-02-08 RX ORDER — MORPHINE SULFATE 10 MG/ML
6 INJECTION, SOLUTION INTRAMUSCULAR; INTRAVENOUS EVERY 10 MIN PRN
Status: DISCONTINUED | OUTPATIENT
Start: 2023-02-08 | End: 2023-02-08 | Stop reason: HOSPADM

## 2023-02-08 RX ORDER — ACETAMINOPHEN 500 MG
1000 TABLET ORAL ONCE
Status: COMPLETED | OUTPATIENT
Start: 2023-02-08 | End: 2023-02-08

## 2023-02-08 RX ORDER — NALOXONE HYDROCHLORIDE 0.4 MG/ML
0.08 INJECTION, SOLUTION INTRAMUSCULAR; INTRAVENOUS; SUBCUTANEOUS
Status: ACTIVE | OUTPATIENT
Start: 2023-02-08 | End: 2023-02-09

## 2023-02-08 RX ORDER — BUPIVACAINE HYDROCHLORIDE 7.5 MG/ML
INJECTION, SOLUTION INTRASPINAL AS NEEDED
Status: DISCONTINUED | OUTPATIENT
Start: 2023-02-08 | End: 2023-02-08 | Stop reason: SURG

## 2023-02-08 RX ORDER — TRAMADOL HYDROCHLORIDE 50 MG/1
50 TABLET ORAL EVERY 6 HOURS
Status: DISCONTINUED | OUTPATIENT
Start: 2023-02-08 | End: 2023-02-11

## 2023-02-08 RX ORDER — LIDOCAINE HYDROCHLORIDE 10 MG/ML
INJECTION, SOLUTION EPIDURAL; INFILTRATION; INTRACAUDAL; PERINEURAL AS NEEDED
Status: DISCONTINUED | OUTPATIENT
Start: 2023-02-08 | End: 2023-02-08 | Stop reason: SURG

## 2023-02-08 RX ORDER — TRANEXAMIC ACID 10 MG/ML
INJECTION, SOLUTION INTRAVENOUS AS NEEDED
Status: DISCONTINUED | OUTPATIENT
Start: 2023-02-08 | End: 2023-02-08 | Stop reason: SURG

## 2023-02-08 RX ORDER — HYDROMORPHONE HYDROCHLORIDE 1 MG/ML
0.6 INJECTION, SOLUTION INTRAMUSCULAR; INTRAVENOUS; SUBCUTANEOUS
Status: ACTIVE | OUTPATIENT
Start: 2023-02-08 | End: 2023-02-09

## 2023-02-08 RX ORDER — CARBAMAZEPINE 200 MG/1
400 TABLET ORAL DAILY
Status: DISCONTINUED | OUTPATIENT
Start: 2023-02-08 | End: 2023-02-11

## 2023-02-08 RX ORDER — PROCHLORPERAZINE EDISYLATE 5 MG/ML
5 INJECTION INTRAMUSCULAR; INTRAVENOUS EVERY 8 HOURS PRN
Status: DISCONTINUED | OUTPATIENT
Start: 2023-02-08 | End: 2023-02-11

## 2023-02-08 RX ORDER — ONDANSETRON 2 MG/ML
4 INJECTION INTRAMUSCULAR; INTRAVENOUS EVERY 6 HOURS PRN
Status: DISCONTINUED | OUTPATIENT
Start: 2023-02-08 | End: 2023-02-11

## 2023-02-08 RX ORDER — CEFAZOLIN SODIUM/WATER 2 G/20 ML
2 SYRINGE (ML) INTRAVENOUS ONCE
Status: COMPLETED | OUTPATIENT
Start: 2023-02-08 | End: 2023-02-08

## 2023-02-08 RX ORDER — HYDROMORPHONE HYDROCHLORIDE 1 MG/ML
0.8 INJECTION, SOLUTION INTRAMUSCULAR; INTRAVENOUS; SUBCUTANEOUS EVERY 2 HOUR PRN
Status: DISCONTINUED | OUTPATIENT
Start: 2023-02-08 | End: 2023-02-11

## 2023-02-08 RX ORDER — DEXAMETHASONE SODIUM PHOSPHATE 4 MG/ML
VIAL (ML) INJECTION AS NEEDED
Status: DISCONTINUED | OUTPATIENT
Start: 2023-02-08 | End: 2023-02-08 | Stop reason: SURG

## 2023-02-08 RX ORDER — DOCUSATE SODIUM 100 MG/1
100 CAPSULE, LIQUID FILLED ORAL 2 TIMES DAILY
Status: DISCONTINUED | OUTPATIENT
Start: 2023-02-08 | End: 2023-02-11

## 2023-02-08 RX ORDER — ONDANSETRON 2 MG/ML
INJECTION INTRAMUSCULAR; INTRAVENOUS AS NEEDED
Status: DISCONTINUED | OUTPATIENT
Start: 2023-02-08 | End: 2023-02-08 | Stop reason: SURG

## 2023-02-08 RX ORDER — MORPHINE SULFATE 4 MG/ML
2 INJECTION, SOLUTION INTRAMUSCULAR; INTRAVENOUS EVERY 10 MIN PRN
Status: DISCONTINUED | OUTPATIENT
Start: 2023-02-08 | End: 2023-02-08 | Stop reason: HOSPADM

## 2023-02-08 RX ORDER — MIDAZOLAM HYDROCHLORIDE 1 MG/ML
INJECTION INTRAMUSCULAR; INTRAVENOUS AS NEEDED
Status: DISCONTINUED | OUTPATIENT
Start: 2023-02-08 | End: 2023-02-08 | Stop reason: SURG

## 2023-02-08 RX ORDER — CELECOXIB 100 MG/1
100 CAPSULE ORAL 2 TIMES DAILY
Status: DISCONTINUED | OUTPATIENT
Start: 2023-02-08 | End: 2023-02-11

## 2023-02-08 RX ORDER — POLYETHYLENE GLYCOL 3350 17 G/17G
17 POWDER, FOR SOLUTION ORAL DAILY PRN
Status: DISCONTINUED | OUTPATIENT
Start: 2023-02-08 | End: 2023-02-11

## 2023-02-08 RX ORDER — SODIUM PHOSPHATE, DIBASIC AND SODIUM PHOSPHATE, MONOBASIC 7; 19 G/133ML; G/133ML
1 ENEMA RECTAL ONCE AS NEEDED
Status: DISCONTINUED | OUTPATIENT
Start: 2023-02-08 | End: 2023-02-11

## 2023-02-08 RX ORDER — MORPHINE SULFATE 4 MG/ML
4 INJECTION, SOLUTION INTRAMUSCULAR; INTRAVENOUS EVERY 10 MIN PRN
Status: DISCONTINUED | OUTPATIENT
Start: 2023-02-08 | End: 2023-02-08 | Stop reason: HOSPADM

## 2023-02-08 RX ORDER — BISACODYL 10 MG
10 SUPPOSITORY, RECTAL RECTAL
Status: DISCONTINUED | OUTPATIENT
Start: 2023-02-08 | End: 2023-02-11

## 2023-02-08 RX ORDER — SODIUM CHLORIDE, SODIUM LACTATE, POTASSIUM CHLORIDE, CALCIUM CHLORIDE 600; 310; 30; 20 MG/100ML; MG/100ML; MG/100ML; MG/100ML
INJECTION, SOLUTION INTRAVENOUS CONTINUOUS
Status: DISCONTINUED | OUTPATIENT
Start: 2023-02-08 | End: 2023-02-08 | Stop reason: HOSPADM

## 2023-02-08 RX ORDER — HYDROMORPHONE HYDROCHLORIDE 1 MG/ML
0.2 INJECTION, SOLUTION INTRAMUSCULAR; INTRAVENOUS; SUBCUTANEOUS EVERY 5 MIN PRN
Status: DISCONTINUED | OUTPATIENT
Start: 2023-02-08 | End: 2023-02-08 | Stop reason: HOSPADM

## 2023-02-08 RX ORDER — HALOPERIDOL 5 MG/ML
0.5 INJECTION INTRAMUSCULAR ONCE AS NEEDED
Status: ACTIVE | OUTPATIENT
Start: 2023-02-08 | End: 2023-02-08

## 2023-02-08 RX ORDER — HYDROCODONE BITARTRATE AND ACETAMINOPHEN 7.5; 325 MG/1; MG/1
2 TABLET ORAL EVERY 6 HOURS PRN
Status: ACTIVE | OUTPATIENT
Start: 2023-02-08 | End: 2023-02-09

## 2023-02-08 RX ORDER — DIPHENHYDRAMINE HCL 25 MG
25 CAPSULE ORAL EVERY 4 HOURS PRN
Status: ACTIVE | OUTPATIENT
Start: 2023-02-08 | End: 2023-02-09

## 2023-02-08 RX ORDER — HYDROMORPHONE HYDROCHLORIDE 1 MG/ML
0.4 INJECTION, SOLUTION INTRAMUSCULAR; INTRAVENOUS; SUBCUTANEOUS EVERY 5 MIN PRN
Status: DISCONTINUED | OUTPATIENT
Start: 2023-02-08 | End: 2023-02-08 | Stop reason: HOSPADM

## 2023-02-08 RX ADMIN — BUPIVACAINE HYDROCHLORIDE 1.5 ML: 7.5 INJECTION, SOLUTION INTRASPINAL at 07:47:00

## 2023-02-08 RX ADMIN — SODIUM CHLORIDE, SODIUM LACTATE, POTASSIUM CHLORIDE, CALCIUM CHLORIDE: 600; 310; 30; 20 INJECTION, SOLUTION INTRAVENOUS at 11:43:00

## 2023-02-08 RX ADMIN — SODIUM CHLORIDE, SODIUM LACTATE, POTASSIUM CHLORIDE, CALCIUM CHLORIDE: 600; 310; 30; 20 INJECTION, SOLUTION INTRAVENOUS at 11:04:00

## 2023-02-08 RX ADMIN — LIDOCAINE HYDROCHLORIDE 20 MG: 10 INJECTION, SOLUTION EPIDURAL; INFILTRATION; INTRACAUDAL; PERINEURAL at 07:47:00

## 2023-02-08 RX ADMIN — TRANEXAMIC ACID 1000 MG: 10 INJECTION, SOLUTION INTRAVENOUS at 10:25:00

## 2023-02-08 RX ADMIN — SODIUM CHLORIDE, SODIUM LACTATE, POTASSIUM CHLORIDE, CALCIUM CHLORIDE: 600; 310; 30; 20 INJECTION, SOLUTION INTRAVENOUS at 10:14:00

## 2023-02-08 RX ADMIN — DEXAMETHASONE SODIUM PHOSPHATE 4 MG: 4 MG/ML VIAL (ML) INJECTION at 07:48:00

## 2023-02-08 RX ADMIN — SODIUM CHLORIDE, SODIUM LACTATE, POTASSIUM CHLORIDE, CALCIUM CHLORIDE: 600; 310; 30; 20 INJECTION, SOLUTION INTRAVENOUS at 10:45:00

## 2023-02-08 RX ADMIN — EPHEDRINE SULFATE 5 MG: 50 INJECTION INTRAVENOUS at 08:28:00

## 2023-02-08 RX ADMIN — ONDANSETRON 4 MG: 2 INJECTION INTRAMUSCULAR; INTRAVENOUS at 07:48:00

## 2023-02-08 RX ADMIN — TRANEXAMIC ACID 1000 MG: 10 INJECTION, SOLUTION INTRAVENOUS at 07:49:00

## 2023-02-08 RX ADMIN — CEFAZOLIN SODIUM/WATER 2 G: 2 G/20 ML SYRINGE (ML) INTRAVENOUS at 07:58:00

## 2023-02-08 RX ADMIN — EPHEDRINE SULFATE 5 MG: 50 INJECTION INTRAVENOUS at 08:27:00

## 2023-02-08 RX ADMIN — MIDAZOLAM HYDROCHLORIDE 2 MG: 1 INJECTION INTRAMUSCULAR; INTRAVENOUS at 07:45:00

## 2023-02-08 RX ADMIN — MORPHINE SULFATE 0.3 MG: 1 INJECTION, SOLUTION EPIDURAL; INTRATHECAL; INTRAVENOUS at 07:47:00

## 2023-02-08 RX ADMIN — EPHEDRINE SULFATE 10 MG: 50 INJECTION INTRAVENOUS at 08:46:00

## 2023-02-08 NOTE — BRIEF OP NOTE
[facility] OPERATING ROOM   Brief Op Note    Patients Name: Gualberto Lal  Attending Physician: Cristiane Bass MD  Operating Physician: Winsome Zuniga MD  CSN: [id number]     Location:  OR  MRN: [id number]    YOB: 1970  Admission Date: 2/8/2023  Operation Date: 2/8/2023    Brief Operative Report    Pre-Operative Diagnosis:  Left knee chronic, progressive and advanced osteoarthritis    Post-Operative Diagnosis:   Same as above. Procedure Performed:   Left total knee arthroplasty    Anesthesia:   Spinal Duramorph    Assistants: Surgical Assistant.: Jacquelyn Arango      EBL:  Blood Output: 50 mL (2/8/2023 11:05 AM)      Specimens:   ID Type Source Tests Collected by Time Destination   1 : 1.  Left Knee Bone and Tissue Tissue knee, left SURGICAL PATHOLOGY TISSUE Cristiane Bass MD 2/8/2023  9:43 AM        Complications: None    Surgical Findings: See above; advanced tricompartmental osteoarthritis greatest in the medial compartment and patellofemoral joint      Winsome Zuniga MD  2/8/2023  11:43 AM

## 2023-02-08 NOTE — CM/SW NOTE
Jeffrey Montiel has been reserved in Aidin. Juju Romeo orders and face to face have been sent in Aidin. Plan: Home with Marcus Romeo when medically cleared.      [facility] ext [phone]

## 2023-02-08 NOTE — INTERVAL H&P NOTE
Pre-op Diagnosis: Left knee osteoarthritis    The above referenced H&P was reviewed by Winsome Zuniga MD on 2/8/2023, the patient was examined and no significant changes have occurred in the patient's condition since the H&P was performed. I discussed with the patient and/or legal representative the potential benefits, risks and side effects of this procedure; the likelihood of the patient achieving goals; and potential problems that might occur during recuperation. I discussed reasonable alternatives to the procedure, including risks, benefits and side effects related to the alternatives and risks related to not receiving this procedure. We will proceed with procedure as planned.

## 2023-02-08 NOTE — ANESTHESIA PROCEDURE NOTES
Spinal Block    Date/Time: 2/8/2023 7:45 AM  Performed by: Joao Peep CRNA  Authorized by: Evita Lee MD       General Information and Staff    Start Time:  2/8/2023 7:45 AM  End Time:  2/8/2023 7:47 AM  Anesthesiologist:  Evita Lee MD  CRNA:  Joao Pepe CRNA  Performed by:  CRNA  Patient Location:  OR  Site identification: surface landmarks    Reason for Block: at surgeon's request, post-op pain management, procedure for pain and surgical anesthesia    Preanesthetic Checklist: patient identified, IV checked, risks and benefits discussed, monitors and equipment checked, pre-op evaluation, timeout performed, anesthesia consent and sterile technique used      Procedure Details    Patient Position:  Sitting  Prep: ChloraPrep    Monitoring:  Heart rate, continuous pulse ox and cardiac monitor  Approach:  Midline  Location:  L4-5  Injection Technique:  Single-shot    Needle    Needle Type:  Pencil-tip  Needle Gauge:  24 G  Needle Length:  3.5 in    Assessment    Sensory Level:  T10  Events: clear CSF, CSF aspirated, well tolerated, sensory level and blood negative      Additional Comments

## 2023-02-08 NOTE — CM/SW NOTE
Department  notified of request for [facility], junaid referrals started. Assigned CM/SW to follow up with pt/family on further discharge planning.      Mina Templeton   February 08, 2023   11:16

## 2023-02-08 NOTE — ANESTHESIA POSTPROCEDURE EVALUATION
Patient: Daniel Malik    Procedure Summary     Date: 02/08/23 Room / Location: [facility] OR  / [facility] OR    Anesthesia Start: 2361 Anesthesia Stop: 1984    Procedure: Left total knee arthroplasty (Left: Knee) Diagnosis: (Left knee osteoarthritis)    Surgeons: Alex Dye MD Anesthesiologist: Tristen Bang MD    Anesthesia Type: spinal ASA Status: 2          Anesthesia Type: spinal    Vitals Value Taken Time   /65 02/08/23 1144   Temp 97 02/08/23 1144   Pulse 59 02/08/23 1144   Resp 12 02/08/23 1144   SpO2 98 % 02/08/23 1144   Vitals shown include unvalidated device data.     [facility] Post Evaluation:   Patient Evaluated in PACU  Patient Participation: complete - patient participated  Level of Consciousness: awake  Pain Score: 0  Pain Management: adequate  Airway Patency:patent  Dental exam unchanged from preop  Yes    Cardiovascular Status: acceptable  Respiratory Status: acceptable  Postoperative Hydration acceptable      Justine Lewis CRNA  2/8/2023 11:44 AM

## 2023-02-09 LAB
ANION GAP SERPL CALC-SCNC: 5 MMOL/L (ref 0–18)
BUN BLD-MCNC: 10 MG/DL (ref 7–18)
BUN/CREAT SERPL: 14.9 (ref 10–20)
CALCIUM BLD-MCNC: 8.1 MG/DL (ref 8.5–10.1)
CHLORIDE SERPL-SCNC: 106 MMOL/L (ref 98–112)
CO2 SERPL-SCNC: 30 MMOL/L (ref 21–32)
CREAT BLD-MCNC: 0.67 MG/DL
GFR SERPLBLD BASED ON 1.73 SQ M-ARVRAT: 104 ML/MIN/1.73M2 (ref 60–?)
GLUCOSE BLD-MCNC: 106 MG/DL (ref 70–99)
HCT VFR BLD AUTO: 30.3 %
HGB BLD-MCNC: 10.3 G/DL
OSMOLALITY SERPL CALC.SUM OF ELEC: 291 MOSM/KG (ref 275–295)
POTASSIUM SERPL-SCNC: 3.9 MMOL/L (ref 3.5–5.1)
SODIUM SERPL-SCNC: 141 MMOL/L (ref 136–145)

## 2023-02-09 PROCEDURE — 97165 OT EVAL LOW COMPLEX 30 MIN: CPT

## 2023-02-09 PROCEDURE — 97110 THERAPEUTIC EXERCISES: CPT

## 2023-02-09 PROCEDURE — 80048 BASIC METABOLIC PNL TOTAL CA: CPT | Performed by: ORTHOPAEDIC SURGERY

## 2023-02-09 PROCEDURE — 97535 SELF CARE MNGMENT TRAINING: CPT

## 2023-02-09 PROCEDURE — 97530 THERAPEUTIC ACTIVITIES: CPT

## 2023-02-09 PROCEDURE — 97116 GAIT TRAINING THERAPY: CPT

## 2023-02-09 PROCEDURE — 85018 HEMOGLOBIN: CPT | Performed by: ORTHOPAEDIC SURGERY

## 2023-02-09 PROCEDURE — 85014 HEMATOCRIT: CPT | Performed by: ORTHOPAEDIC SURGERY

## 2023-02-09 RX ORDER — FUROSEMIDE 10 MG/ML
20 INJECTION INTRAMUSCULAR; INTRAVENOUS ONCE
Status: COMPLETED | OUTPATIENT
Start: 2023-02-09 | End: 2023-02-09

## 2023-02-09 RX ORDER — TRANEXAMIC ACID 10 MG/ML
1000 INJECTION, SOLUTION INTRAVENOUS ONCE
Status: COMPLETED | OUTPATIENT
Start: 2023-02-09 | End: 2023-02-09

## 2023-02-09 NOTE — PHYSICAL THERAPY NOTE
PHYSICAL THERAPY TREATMENT NOTE - INPATIENT     Room Number: 339/274-Y       Presenting Problem: s/p L TKA on 2/8    Problem List  Principal Problem:    Primary osteoarthritis of left knee  Active Problems:    Seizure disorder ([address]      PHYSICAL THERAPY ASSESSMENT   Chart reviewed. RN  approved participation in physical therapy. PPE worn by therapist: mask, gloves and goggles. Patient was wearing a mask during session. Patient presented in bed with 6/10 pain. Patient with good  progress towards goals during this session. Education provided on Total knee exercise protocol, Physical therapy plan of care and physiological benefits of out of bed mobility. Patient with good carryover. Bed mobility: Min assist  Transfers: Min assist  Gait Assistance: Contact guard assist  Distance (ft): 2 x 50  Assistive Device: Rolling walker  Pattern: L Decreased stance time          Pt seen daily. Min a for bed mobility and transfer;EOB sitting balance activity with emphasis on core stabilization. Pt educated on deep breathing as well as relaxation. Pt amb 2 x 50 ft with RW and CGA;limited amb distance due to c/o dizziness. Cuing for gait pattern. The manju. Patient was left in bedside chair at end of session with all needs in reach. The patient's Approx Degree of Impairment: 46.58% has been calculated based on documentation in the [facility] '6 clicks' Inpatient Basic Mobility Short Form. Research supports that patients with this level of impairment may benefit from  24 hour care/supervision. RN aware of patient status post session. DISCHARGE RECOMMENDATIONS  PT Discharge Recommendations: 24 hour care/supervision     PLAN  PT Treatment Plan: Bed mobility; Body mechanics; Endurance;Gait training    SUBJECTIVE  Pt reports being ready for PT RX  OBJECTIVE  Precautions: None    WEIGHT BEARING RESTRICTION  Weight Bearing Restriction: L lower extremity           L Lower Extremity: Weight Bearing as Tolerated    PAIN ASSESSMENT   Rating: 6  Location: left knee  Management Techniques: Activity promotion; Body mechanics;Repositioning    BALANCE                                                                                                                       Static Sitting: Good  Dynamic Sitting: Fair +           Static Standing: Fair  Dynamic Standing: Fair -    ACTIVITY TOLERANCE           BP: 109/51  BP Location: Right arm  BP Method: Automatic  Patient Position: Sitting    O2 WALK       AM-PAC '6-Clicks' INPATIENT SHORT FORM - BASIC MOBILITY  How much difficulty does the patient currently have. .. Patient Difficulty: Turning over in bed (including adjusting bedclothes, sheets and blankets)?: A Little   Patient Difficulty: Sitting down on and standing up from a chair with arms (e.g., wheelchair, bedside commode, etc.): A Little   Patient Difficulty: Moving from lying on back to sitting on the side of the bed?: A Little   How much help from another person does the patient currently need. .. Help from Another: Moving to and from a bed to a chair (including a wheelchair)?: A Little   Help from Another: Need to walk in hospital room?: A Little   Help from Another: Climbing 3-5 steps with a railing?: A Little     AM-PAC Score:  Raw Score: 18   Approx Degree of Impairment: 46.58%   Standardized Score (AM-PAC Scale): 43.63   CMS Modifier (G-Code): CK      Additional information:   THERAPEUTIC EXERCISES  Lower Extremity Ankle pumps  Glut sets  Quad sets     Position Supine       Patient End of Session: In bed;Call light within reach;Up in chair;RN aware of session/findings;Bracing education provided per handout; All patient questions and concerns addressed    CURRENT GOALS       Patient Goal Patient's self-stated goal is: go home   Goal #1 Patient is able to demonstrate supine - sit EOB @ level: modified independent     Goal #1   Current Status Min a   Goal #2 Patient is able to demonstrate transfers Sit to/from Stand at assistance level: modified independent     Goal #2  Current Status Min a   Goal #3 Patient is able to ambulate 200 feet with assistive device at assistance level: supervision   Goal #3   Current Status Pt amb 2 x 50 ft with RW and CGa   Goal #4 Patient will negotiate 4 stairs/one curb w/ assistive device and supervision   Goal #4   Current Status NT   Goal #5  AROM 0 degrees extension to 95 degrees flexion     Goal #5   Current Status In progress   Goal #6 Patient independently performs home exercise program for ROM/strengthening per the instructions provided in preparation for discharge.    Goal #6  Current Status In progress   Total PT session-30 min  -> gait-15 min; there ex-15 min

## 2023-02-10 LAB
BASOPHILS # BLD AUTO: 0.04 X10(3) UL (ref 0–0.2)
BASOPHILS NFR BLD AUTO: 0.4 %
DEPRECATED RDW RBC AUTO: 38 FL (ref 35.1–46.3)
EOSINOPHIL # BLD AUTO: 0.05 X10(3) UL (ref 0–0.7)
EOSINOPHIL NFR BLD AUTO: 0.5 %
ERYTHROCYTE [DISTWIDTH] IN BLOOD BY AUTOMATED COUNT: 12.2 % (ref 11–15)
HCT VFR BLD AUTO: 30.5 %
HGB BLD-MCNC: 10.4 G/DL
IMM GRANULOCYTES # BLD AUTO: 0.04 X10(3) UL (ref 0–1)
IMM GRANULOCYTES NFR BLD: 0.4 %
LYMPHOCYTES # BLD AUTO: 1.53 X10(3) UL (ref 1–4)
LYMPHOCYTES NFR BLD AUTO: 16 %
MCH RBC QN AUTO: 29.1 PG (ref 26–34)
MCHC RBC AUTO-ENTMCNC: 34.1 G/DL (ref 31–37)
MCV RBC AUTO: 85.2 FL
MONOCYTES # BLD AUTO: 1.15 X10(3) UL (ref 0.1–1)
MONOCYTES NFR BLD AUTO: 12 %
NEUTROPHILS # BLD AUTO: 6.77 X10 (3) UL (ref 1.5–7.7)
NEUTROPHILS # BLD AUTO: 6.77 X10(3) UL (ref 1.5–7.7)
NEUTROPHILS NFR BLD AUTO: 70.7 %
PLATELET # BLD AUTO: 262 10(3)UL (ref 150–450)
RBC # BLD AUTO: 3.58 X10(6)UL
WBC # BLD AUTO: 9.6 X10(3) UL (ref 4–11)

## 2023-02-10 PROCEDURE — 85025 COMPLETE CBC W/AUTO DIFF WBC: CPT | Performed by: ORTHOPAEDIC SURGERY

## 2023-02-10 PROCEDURE — 97116 GAIT TRAINING THERAPY: CPT

## 2023-02-10 PROCEDURE — 97110 THERAPEUTIC EXERCISES: CPT

## 2023-02-10 RX ORDER — HYDROCODONE BITARTRATE AND ACETAMINOPHEN 7.5; 325 MG/1; MG/1
1 TABLET ORAL EVERY 4 HOURS PRN
Status: DISCONTINUED | OUTPATIENT
Start: 2023-02-10 | End: 2023-02-11

## 2023-02-10 RX ORDER — CELECOXIB 100 MG/1
200 CAPSULE ORAL
Qty: 40 CAPSULE | Refills: 3 | Status: SHIPPED | OUTPATIENT
Start: 2023-02-10 | End: 2023-02-11

## 2023-02-10 RX ORDER — HYDROCODONE BITARTRATE AND ACETAMINOPHEN 7.5; 325 MG/1; MG/1
1 TABLET ORAL EVERY 4 HOURS PRN
Qty: 50 TABLET | Refills: 0 | Status: SHIPPED | OUTPATIENT
Start: 2023-02-10

## 2023-02-10 RX ORDER — HYDROCODONE BITARTRATE AND ACETAMINOPHEN 7.5; 325 MG/1; MG/1
2 TABLET ORAL EVERY 6 HOURS PRN
Status: DISCONTINUED | OUTPATIENT
Start: 2023-02-10 | End: 2023-02-11

## 2023-02-10 RX ORDER — OXYCODONE AND ACETAMINOPHEN 7.5; 325 MG/1; MG/1
2 TABLET ORAL EVERY 6 HOURS PRN
Status: DISCONTINUED | OUTPATIENT
Start: 2023-02-10 | End: 2023-02-11

## 2023-02-10 NOTE — CM/SW NOTE
02/10/23 1200   Discharge disposition   Expected discharge disposition Home-Health   Post Acute Care Provider [address]  ([facility])   Discharge transportation Private car       Pt discussed during nursing rounds. Pt is stable for dc today. MD dc order entered. Marcus YU notified via Aidin of dc today. Luther Thao [address] [phone]    Plan: Home today w/ Marcus YU. / to remain available for support and/or discharge planning. Hadley Pelayo.  Chikis Granados RN, BSN  Nurse   [phone]

## 2023-02-10 NOTE — PHYSICAL THERAPY NOTE
PHYSICAL THERAPY TREATMENT NOTE - INPATIENT     Room Number: 940/943-R       Presenting Problem: s/p L TKA on 2/8    Problem List  Principal Problem:    Primary osteoarthritis of left knee  Active Problems:    Seizure disorder ([address]    Preop testing      PHYSICAL THERAPY ASSESSMENT   Chart reviewed. RN  approved participation in physical therapy. PPE worn by therapist: mask, gloves and goggles. Patient was wearing a mask during session. Patient presented in bed with 6/10 pain. Patient with good  progress towards goals during this session. Education provided on Total knee exercise protocol, Physical therapy plan of care and physiological benefits of out of bed mobility. Patient with good carryover. Bed mobility: Min assist  Transfers: Min assist  Gait Assistance: Contact guard assist  Distance (ft): 2 x 75  Assistive Device: Rolling walker  Pattern: L Decreased stance time          Pt seen daily. Min a for bed mobility and transfer;EOB sitting balance activity with emphasis on core stabilization. Pt educated on deep breathing as well as relaxation. Pt amb 2 x 75 ft with RW and CGA;limited amb distance due to c/o dizziness  and nausea. .Cuing for gait pattern. Ther ex. Patient was left in bedside chair at end of session with all needs in reach. The patient's Approx Degree of Impairment: 46.58% has been calculated based on documentation in the [facility] '6 clicks' Inpatient Basic Mobility Short Form. Research supports that patients with this level of impairment may benefit from  24 hour care/supervision. RN aware of patient status post session. DISCHARGE RECOMMENDATIONS  PT Discharge Recommendations: 24 hour care/supervision     PLAN  PT Treatment Plan: Bed mobility; Body mechanics; Endurance;Gait training    SUBJECTIVE  Pt reports being ready for PT RX  OBJECTIVE  Precautions: None    WEIGHT BEARING RESTRICTION  Weight Bearing Restriction: L lower extremity           L Lower Extremity: Weight Bearing as Tolerated    PAIN ASSESSMENT   Ratin  Location: left knee  Management Techniques: Activity promotion; Body mechanics;Repositioning    BALANCE                                                                                                                       Static Sitting: Good  Dynamic Sitting: Fair +           Static Standing: Fair  Dynamic Standing: Fair -    ACTIVITY TOLERANCE                         O2 WALK       AM-PAC '6-Clicks' INPATIENT SHORT FORM - BASIC MOBILITY  How much difficulty does the patient currently have. .. Patient Difficulty: Turning over in bed (including adjusting bedclothes, sheets and blankets)?: A Little   Patient Difficulty: Sitting down on and standing up from a chair with arms (e.g., wheelchair, bedside commode, etc.): A Little   Patient Difficulty: Moving from lying on back to sitting on the side of the bed?: A Little   How much help from another person does the patient currently need. .. Help from Another: Moving to and from a bed to a chair (including a wheelchair)?: A Little   Help from Another: Need to walk in hospital room?: A Little   Help from Another: Climbing 3-5 steps with a railing?: A Little     AM-PAC Score:  Raw Score: 18   Approx Degree of Impairment: 46.58%   Standardized Score (AM-PAC Scale): 43.63   CMS Modifier (G-Code): CK      Additional information:   THERAPEUTIC EXERCISES  Lower Extremity Ankle pumps  Glut sets  Quad sets     Position Supine       Patient End of Session: In bed;Call light within reach;RN aware of session/findings;Bracing education provided per handout; All patient questions and concerns addressed    CURRENT GOALS       Patient Goal Patient's self-stated goal is: go home   Goal #1 Patient is able to demonstrate supine - sit EOB @ level: modified independent     Goal #1   Current Status Min a   Goal #2 Patient is able to demonstrate transfers Sit to/from Stand at assistance level: modified independent     Goal #2  Current Status Min a   Goal #3 Patient is able to ambulate 200 feet with assistive device at assistance level: supervision   Goal #3   Current Status Pt amb 2 x 75 ft with RW and CGa   Goal #4 Patient will negotiate 4 stairs/one curb w/ assistive device and supervision   Goal #4   Current Status NT   Goal #5  AROM 0 degrees extension to 95 degrees flexion     Goal #5   Current Status In progress   Goal #6 Patient independently performs home exercise program for ROM/strengthening per the instructions provided in preparation for discharge.    Goal #6  Current Status In progress   Total PT session-30 min  -> gait-15 min; there ex-15 min

## 2023-02-11 VITALS
OXYGEN SATURATION: 100 % | TEMPERATURE: 98 F | DIASTOLIC BLOOD PRESSURE: 69 MMHG | HEIGHT: 67.75 IN | SYSTOLIC BLOOD PRESSURE: 120 MMHG | HEART RATE: 77 BPM | WEIGHT: 149 LBS | RESPIRATION RATE: 18 BRPM | BODY MASS INDEX: 22.85 KG/M2

## 2023-02-11 PROCEDURE — 97530 THERAPEUTIC ACTIVITIES: CPT

## 2023-02-11 PROCEDURE — 97116 GAIT TRAINING THERAPY: CPT

## 2023-02-11 RX ORDER — CELECOXIB 100 MG/1
200 CAPSULE ORAL
Qty: 40 CAPSULE | Refills: 0 | Status: SHIPPED | OUTPATIENT
Start: 2023-02-11

## 2023-02-11 RX ORDER — HYDROCODONE BITARTRATE AND ACETAMINOPHEN 7.5; 325 MG/1; MG/1
1 TABLET ORAL EVERY 6 HOURS PRN
Qty: 10 TABLET | Refills: 0 | Status: SHIPPED | OUTPATIENT
Start: 2023-02-11

## 2023-02-11 NOTE — PHYSICAL THERAPY NOTE
PHYSICAL THERAPY KNEE TREATMENT NOTE - INPATIENT     Room Number: 231/852-J             Presenting Problem: s/p L TKA on        Problem List  Principal Problem:    Primary osteoarthritis of left knee  Active Problems:    Seizure disorder ([address]    Preop testing      PHYSICAL THERAPY ASSESSMENT     RN approve PT session and pt willing to work with PT. Therapist donned with PPE; mask and gloves. Pt with mask when amb outside of room. Pt sitting in chair, instructed on exs to improve full ROM and muscles strength. Sit to stand with SBA and pt amb with RW  120 ft x2 with seated rest beak. Pt with limited  ROM L knee. Focus on heel-toe gait pattern to improve gait. Steps negotiation 2 steps up/down with 1 HR with CGA. Pt stated no dizziness. BP checked after amb 143/79 on R arm and rechecked on L arm 129/83. RN aware of findings. Pt position in chair with elevated feet and placed ice pack on L knee. All needs in reach. The patient's Approx Degree of Impairment: 46.58% has been calculated based on documentation in the [facility] '6 clicks' Inpatient Basic Mobility Short Form. Research supports that patients with this level of impairment may benefit from 24 hrs of care/supervision. DISCHARGE RECOMMENDATIONS  PT Discharge Recommendations: 24 hour care/supervision    PLAN  PT Treatment Plan: Body mechanics; Energy conservation;Patient education;Gait training;Range of motion;Strengthening;Transfer training;Stair training;Balance training  Frequency (Obs): Daily      SUBJECTIVE  I am better today. OBJECTIVE  Precautions: None    WEIGHT BEARING STATUS           L Lower Extremity: Weight Bearing as Tolerated    PAIN ASSESSMENT   Ratin  Location: L knee  Management Techniques:  Activity promotion;Breathing techniques;Repositioning    BALANCE  Static Sitting: Good  Dynamic Sitting: Fair +  Static Standing: Fair +  Dynamic Standing: Fair -    ACTIVITY TOLERANCE                         O2 WALK       AM-PAC '6-Clicks' INPATIENT SHORT FORM - BASIC MOBILITY  How much difficulty does the patient currently have. .. Patient Difficulty: Turning over in bed (including adjusting bedclothes, sheets and blankets)?: None   Patient Difficulty: Sitting down on and standing up from a chair with arms (e.g., wheelchair, bedside commode, etc.): None   Patient Difficulty: Moving from lying on back to sitting on the side of the bed?: None   How much help from another person does the patient currently need. .. Help from Another: Moving to and from a bed to a chair (including a wheelchair)?: None   Help from Another: Need to walk in hospital room?: A Little   Help from Another: Climbing 3-5 steps with a railing?: A Little     AM-PAC Score:  Raw Score: 18   Approx Degree of Impairment: 46.58%   Standardized Score (AM-PAC Scale): 43.63   CMS Modifier (G-Code): CK    FUNCTIONAL ABILITY STATUS  Functional Mobility/Gait Assessment  Gait Assistance:  (SBA)  Distance (ft): 120 ft x2  Assistive Device: Rolling walker  Pattern: L Decreased stance time; Shuffle  Stairs: Stairs  How Many Stairs: 2  Device: 1 Rail  Assist: Contact guard assist  Pattern: Ascend and Descend  Ascend and Descend : Side step    Additional Information:     Exercises AM Session    Ankle Pumps 10 reps    Quad Sets 10 reps    Glut Sets 0 reps    Hip Abd/Add 10 reps    Heel slides 10 reps    Saq 0 reps    SLR 0  reps    Sitting Knee Flexion 10 reps    Standing heel/toe raises 0 reps    Standing knee flexion 0 reps    Extension stretch  1x        Knee ROM                 Patient End of Session: Up in chair;Needs met;Call light within reach;RN aware of session/findings; All patient questions and concerns addressed; Ice applied    CURRENT GOALS    Patient Goal Patient's self-stated goal is: go home   Goal #1 Patient is able to demonstrate supine - sit EOB @ level: modified independent     Goal #1   Current Status NT   Goal #2 Patient is able to demonstrate transfers Sit to/from Stand at assistance level: modified independent     Goal #2  Current Status SBA with RW   Goal #3 Patient is able to ambulate 200 feet with assistive device at assistance level: supervision   Goal #3   Current Status Pt amb 2 x 150 ft with RW and CGA   Goal #4 Patient will negotiate 4 stairs/one curb w/ assistive device and supervision   Goal #4   Current Status 2 steps with RW CGA   Goal #5  AROM 0 degrees extension to 95 degrees flexion     Goal #5   Current Status In progress   Goal #6 Patient independently performs home exercise program for ROM/strengthening per the instructions provided in preparation for discharge.    Goal #6  Current Status In progress       PT Session Time: 30 minutes  Gait Trainin minutes  Therapeutic Activity: 13 minutes

## 2023-02-27 PROBLEM — Z96.652 HISTORY OF TOTAL KNEE ARTHROPLASTY, LEFT: Status: ACTIVE | Noted: 2023-02-27

## 2023-03-23 ENCOUNTER — OFFICE VISIT (OUTPATIENT)
Dept: INTERNAL MEDICINE CLINIC | Facility: CLINIC | Age: 53
End: 2023-03-23

## 2023-03-23 ENCOUNTER — LAB ENCOUNTER (OUTPATIENT)
Dept: LAB | Age: 53
End: 2023-03-23
Attending: INTERNAL MEDICINE
Payer: COMMERCIAL

## 2023-03-23 VITALS
RESPIRATION RATE: 18 BRPM | HEART RATE: 94 BPM | DIASTOLIC BLOOD PRESSURE: 74 MMHG | BODY MASS INDEX: 22.9 KG/M2 | SYSTOLIC BLOOD PRESSURE: 112 MMHG | OXYGEN SATURATION: 98 % | WEIGHT: 147.63 LBS | HEIGHT: 67.5 IN

## 2023-03-23 DIAGNOSIS — R53.83 FATIGUE, UNSPECIFIED TYPE: ICD-10-CM

## 2023-03-23 DIAGNOSIS — D50.9 IRON DEFICIENCY ANEMIA, UNSPECIFIED IRON DEFICIENCY ANEMIA TYPE: ICD-10-CM

## 2023-03-23 DIAGNOSIS — R53.83 FATIGUE, UNSPECIFIED TYPE: Primary | ICD-10-CM

## 2023-03-23 DIAGNOSIS — R10.13 EPIGASTRIC PAIN: ICD-10-CM

## 2023-03-23 DIAGNOSIS — K21.9 GASTROESOPHAGEAL REFLUX DISEASE, UNSPECIFIED WHETHER ESOPHAGITIS PRESENT: ICD-10-CM

## 2023-03-23 LAB
ALBUMIN SERPL-MCNC: 4 G/DL (ref 3.4–5)
ALBUMIN/GLOB SERPL: 1.1 {RATIO} (ref 1–2)
ALP LIVER SERPL-CCNC: 118 U/L
ALT SERPL-CCNC: 24 U/L
ANION GAP SERPL CALC-SCNC: 7 MMOL/L (ref 0–18)
AST SERPL-CCNC: 15 U/L (ref 15–37)
BASOPHILS # BLD AUTO: 0.05 X10(3) UL (ref 0–0.2)
BASOPHILS NFR BLD AUTO: 1.1 %
BILIRUB SERPL-MCNC: 0.2 MG/DL (ref 0.1–2)
BUN BLD-MCNC: 14 MG/DL (ref 7–18)
BUN/CREAT SERPL: 19.4 (ref 10–20)
CALCIUM BLD-MCNC: 9.2 MG/DL (ref 8.5–10.1)
CHLORIDE SERPL-SCNC: 109 MMOL/L (ref 98–112)
CO2 SERPL-SCNC: 30 MMOL/L (ref 21–32)
CREAT BLD-MCNC: 0.72 MG/DL
DEPRECATED HBV CORE AB SER IA-ACNC: 69.4 NG/ML
DEPRECATED RDW RBC AUTO: 38.7 FL (ref 35.1–46.3)
EOSINOPHIL # BLD AUTO: 0.15 X10(3) UL (ref 0–0.7)
EOSINOPHIL NFR BLD AUTO: 3.2 %
ERYTHROCYTE [DISTWIDTH] IN BLOOD BY AUTOMATED COUNT: 12 % (ref 11–15)
FASTING STATUS PATIENT QL REPORTED: NO
GFR SERPLBLD BASED ON 1.73 SQ M-ARVRAT: 99 ML/MIN/1.73M2 (ref 60–?)
GLOBULIN PLAS-MCNC: 3.7 G/DL (ref 2.8–4.4)
GLUCOSE BLD-MCNC: 105 MG/DL (ref 70–99)
HCT VFR BLD AUTO: 39.4 %
HGB BLD-MCNC: 13.1 G/DL
IMM GRANULOCYTES # BLD AUTO: 0 X10(3) UL (ref 0–1)
IMM GRANULOCYTES NFR BLD: 0 %
IRON SATN MFR SERPL: 32 %
IRON SERPL-MCNC: 119 UG/DL
LYMPHOCYTES # BLD AUTO: 1.3 X10(3) UL (ref 1–4)
LYMPHOCYTES NFR BLD AUTO: 27.3 %
MCH RBC QN AUTO: 29 PG (ref 26–34)
MCHC RBC AUTO-ENTMCNC: 33.2 G/DL (ref 31–37)
MCV RBC AUTO: 87.4 FL
MONOCYTES # BLD AUTO: 0.45 X10(3) UL (ref 0.1–1)
MONOCYTES NFR BLD AUTO: 9.5 %
NEUTROPHILS # BLD AUTO: 2.81 X10 (3) UL (ref 1.5–7.7)
NEUTROPHILS # BLD AUTO: 2.81 X10(3) UL (ref 1.5–7.7)
NEUTROPHILS NFR BLD AUTO: 58.9 %
OSMOLALITY SERPL CALC.SUM OF ELEC: 303 MOSM/KG (ref 275–295)
PLATELET # BLD AUTO: 349 10(3)UL (ref 150–450)
POTASSIUM SERPL-SCNC: 4 MMOL/L (ref 3.5–5.1)
PROT SERPL-MCNC: 7.7 G/DL (ref 6.4–8.2)
RBC # BLD AUTO: 4.51 X10(6)UL
SODIUM SERPL-SCNC: 146 MMOL/L (ref 136–145)
TIBC SERPL-MCNC: 373 UG/DL (ref 240–450)
TRANSFERRIN SERPL-MCNC: 250 MG/DL (ref 200–360)
TSI SER-ACNC: 2.36 MIU/ML (ref 0.36–3.74)
WBC # BLD AUTO: 4.8 X10(3) UL (ref 4–11)

## 2023-03-23 PROCEDURE — 84443 ASSAY THYROID STIM HORMONE: CPT

## 2023-03-23 PROCEDURE — 85025 COMPLETE CBC W/AUTO DIFF WBC: CPT

## 2023-03-23 PROCEDURE — 80053 COMPREHEN METABOLIC PANEL: CPT

## 2023-03-23 PROCEDURE — 3078F DIAST BP <80 MM HG: CPT | Performed by: INTERNAL MEDICINE

## 2023-03-23 PROCEDURE — 36415 COLL VENOUS BLD VENIPUNCTURE: CPT

## 2023-03-23 PROCEDURE — 99214 OFFICE O/P EST MOD 30 MIN: CPT | Performed by: INTERNAL MEDICINE

## 2023-03-23 PROCEDURE — 3008F BODY MASS INDEX DOCD: CPT | Performed by: INTERNAL MEDICINE

## 2023-03-23 PROCEDURE — 84466 ASSAY OF TRANSFERRIN: CPT

## 2023-03-23 PROCEDURE — 3074F SYST BP LT 130 MM HG: CPT | Performed by: INTERNAL MEDICINE

## 2023-03-23 PROCEDURE — 82728 ASSAY OF FERRITIN: CPT

## 2023-03-23 PROCEDURE — 83540 ASSAY OF IRON: CPT

## 2023-03-23 RX ORDER — PANTOPRAZOLE SODIUM 40 MG/1
40 TABLET, DELAYED RELEASE ORAL
Qty: 90 TABLET | Refills: 0 | Status: SHIPPED | OUTPATIENT
Start: 2023-03-23

## 2023-04-24 PROBLEM — M79.662 PAIN OF LEFT CALF: Status: ACTIVE | Noted: 2023-04-24

## 2023-05-23 NOTE — CHRONIC PAIN
Follow-up Note  CC: S/P right TFESI L5-S1   HISTORY OF PRESENT ILLNESS:  Helen Joyner is a 44year old old female, originally referred to the pain clinic by Dr. Becky Patino  with history of Lumbar herniated disc  (primary encounter diagnosis)  Sciatic No current chest pain or palpitations   Respiratory:  No current shortness of breath   Gastrointestinal:  No active ulcer  Genitourinary:  Negative  Integumentary :  Negative  Psychiatric:  Negative  Hematologic: No active bleeding  Lymphatic: No current l • Breast Cancer Mother 61        Cause of death, Metastasis; per NG   • Breast Cancer Sister         early 52's   • Diabetes Neg    • Glaucoma Neg    • Heart Disorder Neg        SOCIAL HISTORY:  Social History    Socioeconomic History      Marital status Hazards: Not Asked        Sleep Concern: Not Asked        Stress Concern: Not Asked        Weight Concern: Not Asked        Special Diet: Not Asked        Back Care: Not Asked        Exercise: Yes          walking and biking        Bike Helmet: Not Asked the expected lumbar lordosis. BONES:             No fracture or suspicious osseous lesion is evident. CORD/CAUDA EQUINA:            The conus medullaris terminates at the level of the inferior L1 endplate.  The distal cord and nerve roots have normal elizabeth Approved by (CST): Sabra Anderson MD on 4/24/2019 at 15:23             [place] PHYSICIAN MONITORING PROGRAM REVIEWED  Yes    ASSESSMENT AND PLAN:    Star Jordan is a 44year old  female, with right leg pain secondary to HNP.  She is s/p 4 inject Consent: The rationale for the repair was explained to the patient and consent was obtained. The risks, benefits and alternatives to therapy were discussed in detail. Specifically, the risks of infection, scarring, bleeding, prolonged wound healing, incomplete removal, allergy to anesthesia, nerve injury and recurrence were addressed. Prior to the procedure, the treatment site was clearly identified and confirmed by the patient. All components of Universal Protocol/PAUSE Rule completed.

## 2023-05-25 ENCOUNTER — LAB ENCOUNTER (OUTPATIENT)
Dept: LAB | Facility: HOSPITAL | Age: 53
End: 2023-05-25
Attending: ORTHOPAEDIC SURGERY
Payer: COMMERCIAL

## 2023-05-25 DIAGNOSIS — Z96.652 HISTORY OF TOTAL KNEE ARTHROPLASTY, LEFT: ICD-10-CM

## 2023-05-25 LAB
ANION GAP SERPL CALC-SCNC: 7 MMOL/L (ref 0–18)
BASOPHILS # BLD AUTO: 0.03 X10(3) UL (ref 0–0.2)
BASOPHILS NFR BLD AUTO: 0.8 %
BUN BLD-MCNC: 15 MG/DL (ref 7–18)
BUN/CREAT SERPL: 22.7 (ref 10–20)
CALCIUM BLD-MCNC: 9.1 MG/DL (ref 8.5–10.1)
CHLORIDE SERPL-SCNC: 105 MMOL/L (ref 98–112)
CO2 SERPL-SCNC: 28 MMOL/L (ref 21–32)
CREAT BLD-MCNC: 0.66 MG/DL
DEPRECATED RDW RBC AUTO: 37.9 FL (ref 35.1–46.3)
EOSINOPHIL # BLD AUTO: 0.22 X10(3) UL (ref 0–0.7)
EOSINOPHIL NFR BLD AUTO: 5.6 %
ERYTHROCYTE [DISTWIDTH] IN BLOOD BY AUTOMATED COUNT: 12.4 % (ref 11–15)
FASTING STATUS PATIENT QL REPORTED: NO
GFR SERPLBLD BASED ON 1.73 SQ M-ARVRAT: 104 ML/MIN/1.73M2 (ref 60–?)
GLUCOSE BLD-MCNC: 85 MG/DL (ref 70–99)
HCT VFR BLD AUTO: 39 %
HGB BLD-MCNC: 12.8 G/DL
IMM GRANULOCYTES # BLD AUTO: 0.01 X10(3) UL (ref 0–1)
IMM GRANULOCYTES NFR BLD: 0.3 %
LYMPHOCYTES # BLD AUTO: 1.17 X10(3) UL (ref 1–4)
LYMPHOCYTES NFR BLD AUTO: 29.7 %
MCH RBC QN AUTO: 27.8 PG (ref 26–34)
MCHC RBC AUTO-ENTMCNC: 32.8 G/DL (ref 31–37)
MCV RBC AUTO: 84.8 FL
MONOCYTES # BLD AUTO: 0.4 X10(3) UL (ref 0.1–1)
MONOCYTES NFR BLD AUTO: 10.2 %
NEUTROPHILS # BLD AUTO: 2.11 X10 (3) UL (ref 1.5–7.7)
NEUTROPHILS # BLD AUTO: 2.11 X10(3) UL (ref 1.5–7.7)
NEUTROPHILS NFR BLD AUTO: 53.4 %
OSMOLALITY SERPL CALC.SUM OF ELEC: 290 MOSM/KG (ref 275–295)
PLATELET # BLD AUTO: 333 10(3)UL (ref 150–450)
POTASSIUM SERPL-SCNC: 4.1 MMOL/L (ref 3.5–5.1)
RBC # BLD AUTO: 4.6 X10(6)UL
SODIUM SERPL-SCNC: 140 MMOL/L (ref 136–145)
WBC # BLD AUTO: 3.9 X10(3) UL (ref 4–11)

## 2023-05-25 PROCEDURE — 80048 BASIC METABOLIC PNL TOTAL CA: CPT

## 2023-05-25 PROCEDURE — 85025 COMPLETE CBC W/AUTO DIFF WBC: CPT

## 2023-05-25 PROCEDURE — 36415 COLL VENOUS BLD VENIPUNCTURE: CPT

## 2023-07-17 ENCOUNTER — OFFICE VISIT (OUTPATIENT)
Dept: INTERNAL MEDICINE CLINIC | Facility: CLINIC | Age: 53
End: 2023-07-17

## 2023-07-17 VITALS
BODY MASS INDEX: 24.82 KG/M2 | HEIGHT: 67.5 IN | SYSTOLIC BLOOD PRESSURE: 128 MMHG | DIASTOLIC BLOOD PRESSURE: 74 MMHG | WEIGHT: 160 LBS | HEART RATE: 72 BPM | OXYGEN SATURATION: 96 %

## 2023-07-17 DIAGNOSIS — R60.0 BILATERAL LOWER EXTREMITY EDEMA: Primary | ICD-10-CM

## 2023-07-17 PROCEDURE — 3008F BODY MASS INDEX DOCD: CPT

## 2023-07-17 PROCEDURE — 3074F SYST BP LT 130 MM HG: CPT

## 2023-07-17 PROCEDURE — 99213 OFFICE O/P EST LOW 20 MIN: CPT

## 2023-07-17 PROCEDURE — 3078F DIAST BP <80 MM HG: CPT

## 2023-07-18 ENCOUNTER — HOSPITAL ENCOUNTER (OUTPATIENT)
Dept: ULTRASOUND IMAGING | Facility: HOSPITAL | Age: 53
Discharge: HOME | End: 2023-07-18
Payer: COMMERCIAL

## 2023-07-18 DIAGNOSIS — R60.0 BILATERAL LOWER EXTREMITY EDEMA: ICD-10-CM

## 2023-07-18 PROCEDURE — 93970 EXTREMITY STUDY: CPT

## 2023-10-03 DIAGNOSIS — R56.9 SEIZURE (HCC): ICD-10-CM

## 2023-10-03 RX ORDER — CARBAMAZEPINE 200 MG/1
TABLET ORAL
Qty: 450 TABLET | Refills: 0 | Status: SHIPPED | OUTPATIENT
Start: 2023-10-03

## 2023-10-03 NOTE — TELEPHONE ENCOUNTER
The patient is requesting a refill on: Carbamazepine 200mg - TAKE 2 TABLETS BY MOUTH IN  THE MORNING AND 3 TABLETS  BY MOUTH IN THE EVENING     Last OV: 8/4/22  Next OV: 12/13/23  Last refilled: 9/8/22 #450/3 refills

## 2023-12-13 ENCOUNTER — TELEPHONE (OUTPATIENT)
Dept: NEUROLOGY | Facility: CLINIC | Age: 53
End: 2023-12-13

## 2023-12-13 ENCOUNTER — OFFICE VISIT (OUTPATIENT)
Dept: NEUROLOGY | Facility: CLINIC | Age: 53
End: 2023-12-13
Payer: COMMERCIAL

## 2023-12-13 ENCOUNTER — LAB ENCOUNTER (OUTPATIENT)
Dept: LAB | Facility: HOSPITAL | Age: 53
End: 2023-12-13
Attending: Other
Payer: COMMERCIAL

## 2023-12-13 VITALS — HEIGHT: 67.5 IN | WEIGHT: 160 LBS | BODY MASS INDEX: 24.82 KG/M2

## 2023-12-13 DIAGNOSIS — R56.9 SEIZURE (HCC): Primary | ICD-10-CM

## 2023-12-13 LAB
ALBUMIN SERPL-MCNC: 4.4 G/DL (ref 3.2–4.8)
ALBUMIN/GLOB SERPL: 1.5 {RATIO} (ref 1–2)
ALP LIVER SERPL-CCNC: 103 U/L
ALT SERPL-CCNC: 15 U/L
ANION GAP SERPL CALC-SCNC: 5 MMOL/L (ref 0–18)
AST SERPL-CCNC: 23 U/L (ref ?–34)
BASOPHILS # BLD AUTO: 0.03 X10(3) UL (ref 0–0.2)
BASOPHILS NFR BLD AUTO: 0.9 %
BILIRUB SERPL-MCNC: 0.4 MG/DL (ref 0.3–1.2)
BUN BLD-MCNC: 11 MG/DL (ref 9–23)
BUN/CREAT SERPL: 15.1 (ref 10–20)
CALCIUM BLD-MCNC: 9.3 MG/DL (ref 8.7–10.4)
CARBAMAZEPINE SERPL-MCNC: 15.3 UG/ML (ref 4–12)
CHLORIDE SERPL-SCNC: 102 MMOL/L (ref 98–112)
CO2 SERPL-SCNC: 29 MMOL/L (ref 21–32)
CREAT BLD-MCNC: 0.73 MG/DL
DEPRECATED RDW RBC AUTO: 36.4 FL (ref 35.1–46.3)
EGFRCR SERPLBLD CKD-EPI 2021: 97 ML/MIN/1.73M2 (ref 60–?)
EOSINOPHIL # BLD AUTO: 0.16 X10(3) UL (ref 0–0.7)
EOSINOPHIL NFR BLD AUTO: 4.7 %
ERYTHROCYTE [DISTWIDTH] IN BLOOD BY AUTOMATED COUNT: 11.9 % (ref 11–15)
FASTING STATUS PATIENT QL REPORTED: NO
GLOBULIN PLAS-MCNC: 2.9 G/DL (ref 2.8–4.4)
GLUCOSE BLD-MCNC: 93 MG/DL (ref 70–99)
HCT VFR BLD AUTO: 36.5 %
HGB BLD-MCNC: 12.4 G/DL
IMM GRANULOCYTES # BLD AUTO: 0.01 X10(3) UL (ref 0–1)
IMM GRANULOCYTES NFR BLD: 0.3 %
LYMPHOCYTES # BLD AUTO: 1.1 X10(3) UL (ref 1–4)
LYMPHOCYTES NFR BLD AUTO: 32.6 %
MCH RBC QN AUTO: 28.7 PG (ref 26–34)
MCHC RBC AUTO-ENTMCNC: 34 G/DL (ref 31–37)
MCV RBC AUTO: 84.5 FL
MONOCYTES # BLD AUTO: 0.45 X10(3) UL (ref 0.1–1)
MONOCYTES NFR BLD AUTO: 13.4 %
NEUTROPHILS # BLD AUTO: 1.62 X10 (3) UL (ref 1.5–7.7)
NEUTROPHILS # BLD AUTO: 1.62 X10(3) UL (ref 1.5–7.7)
NEUTROPHILS NFR BLD AUTO: 48.1 %
OSMOLALITY SERPL CALC.SUM OF ELEC: 281 MOSM/KG (ref 275–295)
PLATELET # BLD AUTO: 294 10(3)UL (ref 150–450)
POTASSIUM SERPL-SCNC: 4.6 MMOL/L (ref 3.5–5.1)
PROT SERPL-MCNC: 7.3 G/DL (ref 5.7–8.2)
RBC # BLD AUTO: 4.32 X10(6)UL
SODIUM SERPL-SCNC: 136 MMOL/L (ref 136–145)
WBC # BLD AUTO: 3.4 X10(3) UL (ref 4–11)

## 2023-12-13 PROCEDURE — 80156 ASSAY CARBAMAZEPINE TOTAL: CPT | Performed by: OTHER

## 2023-12-13 PROCEDURE — 80053 COMPREHEN METABOLIC PANEL: CPT | Performed by: OTHER

## 2023-12-13 PROCEDURE — 85025 COMPLETE CBC W/AUTO DIFF WBC: CPT | Performed by: OTHER

## 2023-12-13 PROCEDURE — 99214 OFFICE O/P EST MOD 30 MIN: CPT | Performed by: OTHER

## 2023-12-13 PROCEDURE — 3008F BODY MASS INDEX DOCD: CPT | Performed by: OTHER

## 2023-12-13 RX ORDER — CARBAMAZEPINE 200 MG/1
TABLET ORAL
Qty: 450 TABLET | Refills: 3 | Status: SHIPPED | OUTPATIENT
Start: 2023-12-13

## 2023-12-13 NOTE — TELEPHONE ENCOUNTER
----- Message from Alpesh Estrada MD sent at 12/13/2023 12:42 PM CST -----  Please let the patient know that results of these particular lab tests so far were normal.    Thank you

## 2023-12-13 NOTE — TELEPHONE ENCOUNTER
----- Message from Melissa Nascimento MD sent at 12/13/2023  2:55 PM CST -----  Please let the patient know that results of these particular lab tests so far were normal.  Except Tegretol level was slightly higher. However as long she does not have side effects we can continue with the same dose.     Thank you

## 2023-12-13 NOTE — PROGRESS NOTES
Neurology Follow up Visit     Referred By: Dr. Jang ref. provider found    Chief Complaint:   Chief Complaint   Patient presents with    Consult     NPT- Patient of Dr. Brad Velez. Patient has not had seizures in years. Patient is still taking tegretol. 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 She would stare off, never had any convulsive seizures. She was treated with Tegretol, later switched to carbamazepine. At 1 point there was a consideration to start medication but EEG was done and apparently it was abnormal, therefore she continued with carbamazepine, DiBello 1000 g daily dose. No side effects was reported in first visit with me in December 2023. No recent seizures for a number of years. Past Medical History:   Diagnosis Date    Anxiety state, unspecified     Hip pain     History of back pain     per     History of seizure     Seizure/Epilepsy.  Rx: Tegretol; per NG    History of torn meniscus of left knee 2012    Arthroscopy; per     Lipid screening 05/10/2014    per NG    Lower back pain     Osteoarthritis     left knee    Seizure disorder [facility])        Past Surgical History:   Procedure Laterality Date    APPENDECTOMY      per NG    KNEE ARTHROSCOPY Left 09/19/2012    left knee arthropscopy    KNEE SURGERY Left 02/08/2023    left total knee arthroplasty    LASIK Bilateral 2008    LASIK Plus in [address] CYST(S)      Laparoscopic Ovarian Cystectomy; per     SPINE SURGERY PROCEDURE UNLISTED  2019       Social history:  History   Smoking Status    Never   Smokeless Tobacco    Never     History   Alcohol Use    1.0 - 2.0 standard drink of alcohol/week    1 - 2 Glasses of wine per week     Comment: 2 drinks every other month      History   Drug Use No       Family History   Problem Relation Age of Onset    Hypertension Father Cataracts Father     Hypertension Mother     Breast Cancer Mother 61        Cause of death, Metastasis; per NG    Breast Cancer Sister         early 52's    Diabetes Neg     Glaucoma Neg     Heart Disorder Neg          Current Outpatient Medications:     carBAMazepine 200 MG Oral Tab, TAKE 2 TABLETS BY MOUTH IN  THE MORNING AND 3 TABLETS  BY MOUTH IN THE EVENING, Disp: 450 tablet, Rfl: 0    Multiple Vitamins-Minerals (MULTI VITAMIN/MINERALS) Oral Tab, Take  by mouth daily. , Disp: , Rfl:     No Known Allergies    ROS:   As in HPI, the rest of the 14 system review was done and was negative      Physical Exam:  Vitals:    12/13/23 1116   Weight: 160 lb (72.6 kg)   Height: 67.5\"       General: No apparent distress, well nourished, well groomed. Head- Normocephalic, atraumatic  Eyes- No redness or swelling  ENT- Hearing intake, normal glutition  Neck- No masses or adenopathy  Cv: pulses were palpable and normal, no cyanosis or edema     Neurological:     Mental Status- Alert and oriented x3. Normal attention span and concentration  Thought process intact  Memory intact- recent and remote  Mood intact  Fund of knowledge appropriate for education and age    Language intact including: comprehension, naming, repetition, vocabulary    Cranial Nerves:  VII. Face symmetric, no facial weakness  VIII. Hearing intact to whisper. IX. Pallet elevates symmetrically. XI. Shoulder shrug is intact  XII.  Tongue is midline    Motor Exam:  Muscle tone normal  No atrophy or fasciculations  Strength- upper extremities 5/5 proximally and distally                    Rapid alternating movements intact    Gait:  Normal posture  Normal physiologic      Labs:    Lab Results   Component Value Date    TSH 2.360 03/23/2023     Lab Results   Component Value Date    HDL 93 (H) 05/10/2014     (H) 05/10/2014    TRIG 41 05/10/2014     Lab Results   Component Value Date    HGB 12.8 05/25/2023    HCT 39.0 05/25/2023    MCV 84.8 05/25/2023 WBC 3.9 (L) 05/25/2023    .0 05/25/2023      Lab Results   Component Value Date    BUN 15 05/25/2023    CA 9.1 05/25/2023    ALT 24 03/23/2023    AST 15 03/23/2023    ALKPHOS 59 05/10/2014    ALB 4.0 03/23/2023     05/25/2023    K 4.1 05/25/2023     05/25/2023    CO2 28.0 05/25/2023      I have reviewed labs. In 2014 level of carbamazepine was normal.    Assessment   1. Seizure ([facility] [address]  Absent epilepsy. Continue with the same dose of care with the patient. We emphasized importance of compliance. CBC, CMP and levels will be checked again. Education and counseling provided to patient. Instructed patient to call my office or seek medical attention immediately if symptoms worsen. Patient verbalized understanding of information given. All questions were answered. All side effects of drugs were discussed. Return to clinic in: No follow-ups on file.     Lou Gates MD

## 2023-12-26 NOTE — TELEPHONE ENCOUNTER
Patient called requesting referral to Chiropractor - stated has went through injections and P.T. and continues to have pain from pinched nerve. Please include DX code and sign off if agree.   Thank you,  [address] For information on Fall & Injury Prevention, visit: [email or web address] OR  [email or web address] OR  https://www.cdc.gov/steadi/patient.html For information on Fall & Injury Prevention, visit: [email or web address] OR  [email or web address] OR  https://www.cdc.gov/steadi/patient.html

## 2024-05-20 PROBLEM — M25.532 LEFT WRIST PAIN: Status: ACTIVE | Noted: 2024-05-20

## 2024-05-20 PROBLEM — M65.4 DE QUERVAIN'S DISEASE (TENOSYNOVITIS): Status: ACTIVE | Noted: 2024-05-20

## 2024-07-29 PROBLEM — Z96.652 HISTORY OF TOTAL KNEE REPLACEMENT, LEFT: Status: ACTIVE | Noted: 2023-02-27

## 2024-10-07 ENCOUNTER — TELEPHONE (OUTPATIENT)
Dept: INTERNAL MEDICINE CLINIC | Facility: CLINIC | Age: 54
End: 2024-10-07

## 2024-10-18 DIAGNOSIS — R56.9 SEIZURE (HCC): ICD-10-CM

## 2024-10-21 RX ORDER — CARBAMAZEPINE 200 MG/1
TABLET ORAL
Qty: 450 TABLET | Refills: 0 | Status: SHIPPED | OUTPATIENT
Start: 2024-10-21

## 2024-10-21 NOTE — TELEPHONE ENCOUNTER
Pt requesting a refill of CBZ 200mg tablets.    Last office visit: 12/13/23    Next office visit: None scheduled.       Assessment  1. Seizure (H[facility])  Absent epilepsy.  Continue with the same dose of care with the patient.  We emphasized importance of compliance.  CBC, CMP and levels will be checked again.                 Education and counseling provided to patient. Instructed patient to call my office or seek medical attention immediately if symptoms worsen.  Patient verbalized understanding of information given. All questions were answered. All side effects of drugs were discussed.         Return to clinic in: No follow-ups on file.     Ced Mccollum MD

## 2025-01-16 DIAGNOSIS — R56.9 SEIZURE (HCC): ICD-10-CM

## 2025-01-20 RX ORDER — CARBAMAZEPINE 200 MG/1
TABLET ORAL
Qty: 450 TABLET | Refills: 3 | Status: SHIPPED | OUTPATIENT
Start: 2025-01-20

## 2025-01-20 NOTE — TELEPHONE ENCOUNTER
Requested Prescriptions     Pending Prescriptions Disp Refills    CARBAMAZEPINE 200 MG Oral Tab [Pharmacy Med Name: CARBAMAZEPINE  200MG  TAB] 450 tablet 3     Sig: TAKE 2 TABLETS BY MOUTH IN THE  MORNING AND 3 TABLETS BY MOUTH  IN THE EVENING       Last OV: 1/2023 Seizures  Next OV: Next Appt: With Neurology (Ced Mccollum MD)   2/17/2025 at 11:30 AM    [place]/;      Last office visit plan;    Assessment  1. Seizure (HCC)  Absent epilepsy.  Continue with the same dose of care with the patient.  We emphasized importance of compliance.  CBC, CMP and levels will be checked again.       Education and counseling provided to patient. Instructed patient to call my office or seek medical attention immediately if symptoms worsen.  Patient verbalized understanding of information given. All questions were answered. All side effects of drugs were discussed.         Return to clinic in: No follow-ups on file.     Ced Mccollum MD                 Instructions  Return in about 1 year (around 12/13/2024).

## 2025-01-27 ENCOUNTER — OFFICE VISIT (OUTPATIENT)
Dept: INTERNAL MEDICINE CLINIC | Facility: CLINIC | Age: 55
End: 2025-01-27

## 2025-01-27 VITALS
WEIGHT: 165.5 LBS | HEART RATE: 66 BPM | DIASTOLIC BLOOD PRESSURE: 59 MMHG | SYSTOLIC BLOOD PRESSURE: 114 MMHG | BODY MASS INDEX: 25.08 KG/M2 | HEIGHT: 68 IN

## 2025-01-27 DIAGNOSIS — E55.9 VITAMIN D DEFICIENCY: ICD-10-CM

## 2025-01-27 DIAGNOSIS — Z12.31 SCREENING MAMMOGRAM, ENCOUNTER FOR: ICD-10-CM

## 2025-01-27 DIAGNOSIS — Z00.00 PHYSICAL EXAM, ANNUAL: Primary | ICD-10-CM

## 2025-01-27 DIAGNOSIS — R53.83 FATIGUE, UNSPECIFIED TYPE: ICD-10-CM

## 2025-01-27 DIAGNOSIS — Z12.83 SKIN CANCER SCREENING: ICD-10-CM

## 2025-01-27 DIAGNOSIS — R56.9 SEIZURES (HCC): ICD-10-CM

## 2025-01-27 DIAGNOSIS — H53.8 BLURRY VISION: ICD-10-CM

## 2025-01-27 NOTE — PROGRESS NOTES
Charo Godoy is a 54 year old female.  Chief Complaint   Patient presents with    Physical     Declines flu vaccine        HPI:   Pt comes for her annual physical   C/c annual physical   C/o has been having some ? Blurry vision - wondering if its from her carbamazepine , ? Relation to food , comes and goes , does have to wear readers   Gained wt -- back surg knee surg and going through menapause       HISTORY  had knee surg 2/8/2023 andd doing well except some overall \"not feeling well\" and the food doesn't taste the same   norco made her feel dizzy  Was taking celebrex   Knee pain ok except for after PT   Stomach pain   Was on aspirin for 3 weeks after her surgery  Lost her job one week after the surg so stressed         HISTORY  Her most recent surgery was the back surg a few yrs ago , no significant  change in health since then ie 2020 by dr gamino            Problem list  Seizures  Osteoarthritis of the knees - left --sees dr rudd   S1 radiculopathy and scitica      Worked in retail         History   2/19 visit    C/c right leg pulled muslce   C/o above x2 weeks   Tried icy hot   Falls trauma injury that she is aware of  Lips are better - taking b complex which finished and mvi   To her right buttock -- can feel it but feels tight and is worse when she leans over and more in the morning she thinks           History   8/17 visit - first visit   Used see dr xavier   C/o seizures and referrals   C/o left elbow tendonitis -- got shots before but shwill mention it to him   Also referal to dr rudd for oa knee   Ros as stated below       Current Outpatient Medications   Medication Sig Dispense Refill    carBAMazepine 200 MG Oral Tab TAKE 2 TABLETS BY MOUTH IN THE  MORNING AND 3 TABLETS BY MOUTH  IN THE EVENING 450 tablet 3    Multiple Vitamins-Minerals (MULTI VITAMIN/MINERALS) Oral Tab Take  by mouth daily.        Past Medical History:    Anxiety state, unspecified    Hip pain    History of back pain     per NG    History of seizure    Seizure/Epilepsy. Rx: Tegretol; per NG    History of torn meniscus of left knee    Arthroscopy; per NG    Lipid screening    per NG    Lower back pain    Osteoarthritis    left knee    Seizure disorder (HCC)      Past Surgical History:   Procedure Laterality Date    Appendectomy      per NG    Knee arthroscopy Left 09/19/2012    left knee arthropscopy    Knee surgery Left 02/08/2023    left total knee arthroplasty    Lasik Bilateral 2008    LASIK Plus in [place]    Removal of ovarian cyst(s)      Laparoscopic Ovarian Cystectomy; per NG    Spine surgery procedure unlisted  2019      Social History:  Social History     Socioeconomic History    Marital status:    Tobacco Use    Smoking status: Never    Smokeless tobacco: Never    Tobacco comments:     per NG   Vaping Use    Vaping status: Never Used   Substance and Sexual Activity    Alcohol use: Yes     Alcohol/week: 1.0 - 2.0 standard drink of alcohol     Types: 1 - 2 Glasses of wine per week     Comment: 2 drinks every other month     Drug use: No    Sexual activity: Yes     Partners: Male     Birth control/protection: Condom   Other Topics Concern    Caffeine Concern No     Comment: Soda, 16oz    Exercise Yes     Comment: walking and biking    Pt has a pacemaker No    Pt has a defibrillator No    Reaction to local anesthetic No   Social History Narrative    The patient does not use an assistive device..      The patient does live in a home with stairs.        REVIEW OF SYSTEMS:   GENERAL HEALTH: No fevers, chills, sweats, fatigue  VISION: No recent vision problems, + blurry vision , no double vision  HEENT: No decreased hearing ear pain nasal congestion or sore throat  SKIN: denies any unusual skin lesions or rashes  RESPIRATORY: denies shortness of breath, cough, wheezing  CARDIOVASCULAR: denies chest pain on exertion, palpitations, swelling in feet  GI: denies abdominal pain and denies heartburn, nausea or vomiting  : No  Pain on urination, change in the color of urine, discharge, urinating frequently  MUS: No back pain, joint pain, muscle pain  NEURO: denies headaches , anxiety, depression    EXAM:   /59   Pulse 66   Ht 5' 8\" (1.727 m)   Wt 165 lb 8 oz (75.1 kg)   BMI 25.16 kg/m²   GENERAL: well developed, well nourished,in no apparent distress  SKIN: no rashes,no suspicious lesions  HEENT: atraumatic, normocephalic,ears and throat are clear, no frontal or maxillary sinus tenderness , pupils equal and reactive to light , EOMI   NECK: supple,no adenopathy, nontender   LUNGS: clear to auscultation, no wheeze  CARDIO: RRR without murmur  GI: good BS's,no masses or tenderness  EXTREMITIES: no cyanosis, or edema  Declined breast exam     ASSESSMENT AND PLAN:   Diagnoses and all orders for this visit:    Physical exam, annual  -     Comp Metabolic Panel (14); Future  -     Lipid Panel; Future  -     TSH W Reflex To Free T4; Future  -     CBC, Platelet; No Differential; Future    Screening mammogram, encounter for  -     [facility] BEN 2D+3D SCREENING BILAT (CPT=77067/79844); Future  Ordered has appointment with neurology, will check  Seizures (HCC)  -     Carbamazepine (Tegretol) Total; Future  Levels  Blurry vision  -     Ophthalmology Referral - In Network  Refer for evaluation  Vitamin D deficiency  Monitor, recheck  Fatigue, unspecified type  -     Vitamin B12; Future  -     Folic Acid Serum (Folate); Future  Check labs   Skin cancer screening  -     DERM - INTERNAL  Refer     Preventive medicine  Mammogram 7/2022  colonoscopy- 2/2021 dr cox and rpt in 5 yrs ie 2026   Pap smear- 1/20/2017 and 6/2022  by Dr. Kenny, due for 2027   Labs January 2023 reviewed      The patient indicates understanding of these issues and agrees to the plan.  No follow-ups on file.

## 2025-02-03 ENCOUNTER — LAB ENCOUNTER (OUTPATIENT)
Dept: LAB | Age: 55
End: 2025-02-03
Attending: INTERNAL MEDICINE
Payer: COMMERCIAL

## 2025-02-03 ENCOUNTER — OFFICE VISIT (OUTPATIENT)
Dept: DERMATOLOGY CLINIC | Facility: CLINIC | Age: 55
End: 2025-02-03

## 2025-02-03 DIAGNOSIS — D22.9 MULTIPLE NEVI: ICD-10-CM

## 2025-02-03 DIAGNOSIS — R56.9 SEIZURES (HCC): ICD-10-CM

## 2025-02-03 DIAGNOSIS — L57.0 ACTINIC KERATOSIS: ICD-10-CM

## 2025-02-03 DIAGNOSIS — R53.83 FATIGUE, UNSPECIFIED TYPE: ICD-10-CM

## 2025-02-03 DIAGNOSIS — E55.9 VITAMIN D DEFICIENCY: ICD-10-CM

## 2025-02-03 DIAGNOSIS — Z00.00 PHYSICAL EXAM, ANNUAL: ICD-10-CM

## 2025-02-03 DIAGNOSIS — Z12.83 SCREENING EXAM FOR SKIN CANCER: Primary | ICD-10-CM

## 2025-02-03 DIAGNOSIS — L57.8 SUN-DAMAGED SKIN: ICD-10-CM

## 2025-02-03 LAB
ALBUMIN SERPL-MCNC: 4.6 G/DL (ref 3.2–4.8)
ALBUMIN/GLOB SERPL: 1.7 {RATIO} (ref 1–2)
ALP LIVER SERPL-CCNC: 97 U/L
ALT SERPL-CCNC: 19 U/L
ANION GAP SERPL CALC-SCNC: 6 MMOL/L (ref 0–18)
AST SERPL-CCNC: 23 U/L (ref ?–34)
BILIRUB SERPL-MCNC: 0.3 MG/DL (ref 0.3–1.2)
BUN BLD-MCNC: 12 MG/DL (ref 9–23)
BUN/CREAT SERPL: 15.4 (ref 10–20)
CALCIUM BLD-MCNC: 9 MG/DL (ref 8.7–10.4)
CARBAMAZEPINE SERPL-MCNC: 13.6 UG/ML (ref 4–12)
CHLORIDE SERPL-SCNC: 103 MMOL/L (ref 98–112)
CHOLEST SERPL-MCNC: 239 MG/DL (ref ?–200)
CO2 SERPL-SCNC: 30 MMOL/L (ref 21–32)
CREAT BLD-MCNC: 0.78 MG/DL
DEPRECATED RDW RBC AUTO: 35.8 FL (ref 35.1–46.3)
EGFRCR SERPLBLD CKD-EPI 2021: 89 ML/MIN/1.73M2 (ref 60–?)
ERYTHROCYTE [DISTWIDTH] IN BLOOD BY AUTOMATED COUNT: 11.7 % (ref 11–15)
FASTING PATIENT LIPID ANSWER: YES
FASTING STATUS PATIENT QL REPORTED: YES
FOLATE SERPL-MCNC: >48 NG/ML (ref 5.4–?)
GLOBULIN PLAS-MCNC: 2.7 G/DL (ref 2–3.5)
GLUCOSE BLD-MCNC: 90 MG/DL (ref 70–99)
HCT VFR BLD AUTO: 37.8 %
HDLC SERPL-MCNC: 90 MG/DL (ref 40–59)
HGB BLD-MCNC: 12.9 G/DL
LDLC SERPL CALC-MCNC: 139 MG/DL (ref ?–100)
MCH RBC QN AUTO: 28.8 PG (ref 26–34)
MCHC RBC AUTO-ENTMCNC: 34.1 G/DL (ref 31–37)
MCV RBC AUTO: 84.4 FL
NONHDLC SERPL-MCNC: 149 MG/DL (ref ?–130)
OSMOLALITY SERPL CALC.SUM OF ELEC: 287 MOSM/KG (ref 275–295)
PLATELET # BLD AUTO: 339 10(3)UL (ref 150–450)
POTASSIUM SERPL-SCNC: 4.4 MMOL/L (ref 3.5–5.1)
PROT SERPL-MCNC: 7.3 G/DL (ref 5.7–8.2)
RBC # BLD AUTO: 4.48 X10(6)UL
SODIUM SERPL-SCNC: 139 MMOL/L (ref 136–145)
TRIGL SERPL-MCNC: 60 MG/DL (ref 30–149)
TSI SER-ACNC: 3.48 UIU/ML (ref 0.55–4.78)
VIT B12 SERPL-MCNC: 451 PG/ML (ref 211–911)
VIT D+METAB SERPL-MCNC: 9.4 NG/ML (ref 30–100)
VLDLC SERPL CALC-MCNC: 11 MG/DL (ref 0–30)
WBC # BLD AUTO: 2.9 X10(3) UL (ref 4–11)

## 2025-02-03 PROCEDURE — 84443 ASSAY THYROID STIM HORMONE: CPT

## 2025-02-03 PROCEDURE — 85027 COMPLETE CBC AUTOMATED: CPT

## 2025-02-03 PROCEDURE — 82607 VITAMIN B-12: CPT

## 2025-02-03 PROCEDURE — 82746 ASSAY OF FOLIC ACID SERUM: CPT

## 2025-02-03 PROCEDURE — 36415 COLL VENOUS BLD VENIPUNCTURE: CPT

## 2025-02-03 PROCEDURE — 82306 VITAMIN D 25 HYDROXY: CPT

## 2025-02-03 PROCEDURE — 80156 ASSAY CARBAMAZEPINE TOTAL: CPT

## 2025-02-03 PROCEDURE — 80053 COMPREHEN METABOLIC PANEL: CPT

## 2025-02-03 PROCEDURE — 80061 LIPID PANEL: CPT

## 2025-02-03 NOTE — PROGRESS NOTES
HPI:    Patient ID: Charo Godoy is a 54 year old female.    Patient presents for evaluation of lesions of lesions on the body. Personal hx of Aks in the past. No family hx of skin cancer. She does try to wear sun screen daily. She did have a lot of sun exposure when she was younger. No draining or tenderness noted. No allergies to medications noted.         Review of Systems   Constitutional:  Negative for chills and fever.   Musculoskeletal:  Negative for arthralgias and myalgias.   Skin:  Positive for rash. Negative for color change and wound.            Current Outpatient Medications   Medication Sig Dispense Refill    carBAMazepine 200 MG Oral Tab TAKE 2 TABLETS BY MOUTH IN THE  MORNING AND 3 TABLETS BY MOUTH  IN THE EVENING 450 tablet 3    Multiple Vitamins-Minerals (MULTI VITAMIN/MINERALS) Oral Tab Take  by mouth daily.       Allergies:Allergies[1]   There were no vitals taken for this visit.  There is no height or weight on file to calculate BMI.  PHYSICAL EXAM:   Physical Exam  Constitutional:       General: She is not in acute distress.     Appearance: Normal appearance.   Skin:     General: Skin is warm and dry.      Findings: Rash present.      Comments: Multiple nevi noted throughout the body. No draining or tenderness noted. No scaling noted. No erythema noted. Scattered brown and tan macules. Dermoscopy shows benign patterns.     Aks noted on the arms bilaterally on the forearms and the scalp. No drianing or tenderness noted. Tight pink macules with slight scaling.     Sun damaged skin noted on the shoulders, face, and arms.        Neurological:      Mental Status: She is alert and oriented to person, place, and time.                ASSESSMENT/PLAN:   1. Screening exam for skin cancer  -After discussion with patient, advised the following:  Reassurance regarding other benign skin lesions. Signs and symptoms of skin cancer, ABCDE's of melanoma discussed with patient. Sunscreen use, sun  protection, self exams reviewed. Followup as noted RTC ---routine checkup 6 mos -one year or p.r.n.   -Pt was agreeable to plan and will comply with discussion above.     2. Actinic keratosis  -After discussion with patient, advised the following:  - Cryosurgery of non-malignant lesion(s)  - Risks, benefits, alternatives and personnel required for cryosurgery reviewed with patient. Discussed the expected redness, as well as the risks of swelling, blistering, crusting, pigmentary changes, and permanent scarring. . Discussed that lesion may need additional treatments in future or may recur. Pt verbalizes understanding and wishes to proceed.   - Cryosurgery performed with Liquid Nitrogen via cryostat spray gun to Multiple Actinic Keratosis. 3 lesion(s) treated.   - Patient tolerated well and wound care discussed.   -Pt was agreeable to plan and will comply with discussion above.     3. Multiple nevi  -After discussion with patient, advised the following:  -Benign lesions  -Observe for now  -Return if there are changes.   -Went over ABCDE changes with the patient.   -Encouraged sun protection with SPF 30 or above.  -To call or follow-up with worsening symptoms or concerns.   -Pt was agreeable to plan and will comply with discussion above.     4. Sun-damaged skin  -After discussion with patient, advised the following:  -Encouraged sun screen use daily   -To call or follow-up with worsening symptoms or concerns  -Patient was agreeable to plan and will comply with discussion above.         No orders of the defined types were placed in this encounter.      Meds This Visit:  Requested Prescriptions      No prescriptions requested or ordered in this encounter       Imaging & Referrals:  None         ID#[id number]       [1] No Known Allergies

## 2025-02-12 ENCOUNTER — NURSE TRIAGE (OUTPATIENT)
Dept: INTERNAL MEDICINE CLINIC | Facility: CLINIC | Age: 55
End: 2025-02-12

## 2025-02-12 DIAGNOSIS — D72.818 OTHER DECREASED WHITE BLOOD CELL (WBC) COUNT: Primary | ICD-10-CM

## 2025-02-12 DIAGNOSIS — E55.9 VITAMIN D DEFICIENCY: ICD-10-CM

## 2025-02-12 RX ORDER — ERGOCALCIFEROL 1.25 MG/1
50000 CAPSULE, LIQUID FILLED ORAL WEEKLY
Qty: 4 CAPSULE | Refills: 3 | Status: SHIPPED | OUTPATIENT
Start: 2025-02-12

## 2025-02-12 NOTE — TELEPHONE ENCOUNTER
Action Requested: Summary for Provider     []  Critical Lab, Recommendations Needed  [] Need Additional Advice  [x]   FYI    []   Need Orders  [] Need Medications Sent to Pharmacy  []  Other     SUMMARY:  Patient stated that since yesterday her left eye has white discharge, redness, and is irritated. Normally has dry eyes. Does not have appointment with ophthalmologist till March. Has a stuffy nose.  No eye pain or flashes of light. Does not wear contact or glasses. No other symptoms. Wanted to be seen tomorrow. Declined appointment for today. Appointment made for tomorrow at 8:20am with Dr Charito Garcia at [facility].   Advised patient that if symptoms worse to go to walk in clinic or urgent care today for an evaluation. Patient agreed.   Reason for call: Eye Problem (Left eye discharge, redness, irritation)  Onset: Feb 11, 2025  Reason for Disposition   Patient wants to be seen    Protocols used: Eye - Pus or [id number]

## 2025-02-13 ENCOUNTER — OFFICE VISIT (OUTPATIENT)
Dept: INTERNAL MEDICINE CLINIC | Facility: CLINIC | Age: 55
End: 2025-02-13

## 2025-02-13 VITALS
WEIGHT: 165 LBS | SYSTOLIC BLOOD PRESSURE: 127 MMHG | BODY MASS INDEX: 25.01 KG/M2 | HEIGHT: 68 IN | DIASTOLIC BLOOD PRESSURE: 67 MMHG | HEART RATE: 59 BPM

## 2025-02-13 DIAGNOSIS — E55.9 VITAMIN D DEFICIENCY: ICD-10-CM

## 2025-02-13 DIAGNOSIS — H57.89 EYE IRRITATION: Primary | ICD-10-CM

## 2025-02-13 PROCEDURE — 99213 OFFICE O/P EST LOW 20 MIN: CPT | Performed by: INTERNAL MEDICINE

## 2025-02-13 PROCEDURE — G2211 COMPLEX E/M VISIT ADD ON: HCPCS | Performed by: INTERNAL MEDICINE

## 2025-02-13 PROCEDURE — 3074F SYST BP LT 130 MM HG: CPT | Performed by: INTERNAL MEDICINE

## 2025-02-13 PROCEDURE — 3008F BODY MASS INDEX DOCD: CPT | Performed by: INTERNAL MEDICINE

## 2025-02-13 PROCEDURE — 3078F DIAST BP <80 MM HG: CPT | Performed by: INTERNAL MEDICINE

## 2025-02-13 NOTE — PROGRESS NOTES
Charo Godoy is a 54 year old female.  Chief Complaint   Patient presents with    Eye Problem     Discharge denies pain        HPI:   Urgent visit   C/c left eye   C/o waking up crusting in the left eye x few days   That her eye does get red sometimes, no itchy , no abnormal yellow thick , no gritty feeling [facility]   discharge but in the morning when she wakes up it is crusty  Has artificial TEARS   No dry mouth lips or any other placed       HISTORY  has been having some ? Blurry vision - wondering if its from her carbamazepine , ? Relation to food , comes and goes , does have to wear readers   Gained wt -- back surg knee surg and going through menapause         HISTORY  had knee surg 2/8/2023 andd doing well except some overall \"not feeling well\" and the food doesn't taste the same   norco made her feel dizzy  Was taking celebrex   Knee pain ok except for after PT   Stomach pain   Was on aspirin for 3 weeks after her surgery  Lost her job one week after the surg so stressed         HISTORY  Her most recent surgery was the back surg a few yrs ago , no significant  change in health since then ie 2020 by dr gamino            Problem list  Seizures  Osteoarthritis of the knees - left --sees dr rudd   S1 radiculopathy and scitica      Worked in retail         History   2/19 visit    C/c right leg pulled muslce   C/o above x2 weeks   Tried icy hot   Falls trauma injury that she is aware of  Lips are better - taking b complex which finished and mvi   To her right buttock -- can feel it but feels tight and is worse when she leans over and more in the morning she thinks           History   8/17 visit - first visit   Used see dr xavier   C/o seizures and referrals   C/o left elbow tendonitis -- got shots before but shwill mention it to him   Also referal to dr rudd for oa knee   Ros as stated below     Current Outpatient Medications   Medication Sig Dispense Refill    carBAMazepine 200 MG Oral Tab TAKE 2  TABLETS BY MOUTH IN THE  MORNING AND 3 TABLETS BY MOUTH  IN THE EVENING 450 tablet 3    Multiple Vitamins-Minerals (MULTI VITAMIN/MINERALS) Oral Tab Take  by mouth daily.      ergocalciferol 1.25 MG (06087 UT) Oral Cap Take 1 capsule (50,000 Units total) by mouth once a week. 4 capsule 3      Past Medical History:    Anxiety state, unspecified    Hip pain    History of back pain    per NG    History of seizure    Seizure/Epilepsy. Rx: Tegretol; per NG    History of torn meniscus of left knee    Arthroscopy; per NG    Lipid screening    per NG    Lower back pain    Osteoarthritis    left knee    Seizure disorder (HCC)      Past Surgical History:   Procedure Laterality Date    Appendectomy      per NG    Knee arthroscopy Left 09/19/2012    left knee arthropscopy    Knee surgery Left 02/08/2023    left total knee arthroplasty    Lasik Bilateral 2008    LASIK Plus in [place]    Removal of ovarian cyst(s)      Laparoscopic Ovarian Cystectomy; per NG    Spine surgery procedure unlisted  2019      Social History:  Social History     Socioeconomic History    Marital status:    Tobacco Use    Smoking status: Never    Smokeless tobacco: Never    Tobacco comments:     per NG   Vaping Use    Vaping status: Never Used   Substance and Sexual Activity    Alcohol use: Yes     Alcohol/week: 1.0 - 2.0 standard drink of alcohol     Types: 1 - 2 Glasses of wine per week     Comment: 2 drinks every other month     Drug use: No    Sexual activity: Yes     Partners: Male     Birth control/protection: Condom   Other Topics Concern    Caffeine Concern No     Comment: Soda, 16oz    Exercise Yes     Comment: walking and biking    Pt has a pacemaker No    Pt has a defibrillator No    Breast feeding No    Reaction to local anesthetic No   Social History Narrative    The patient does not use an assistive device..      The patient does live in a home with stairs.        REVIEW OF SYSTEMS:   GENERAL HEALTH: No fevers, chills, sweats,  fatigue  VISION: No recent vision problems, blurry vision or double vision  HEENT: No decreased hearing ear pain nasal congestion or sore throat  SKIN: denies any unusual skin lesions or rashes  RESPIRATORY: denies shortness of breath, cough, wheezing  CARDIOVASCULAR: denies chest pain on exertion, palpitations, swelling in feet  GI: denies abdominal pain and denies heartburn, nausea or vomiting  : No Pain on urination, change in the color of urine, discharge, urinating frequently  MUS: No back pain, joint pain, muscle pain  NEURO: denies headaches , anxiety, depression    EXAM:   /67   Pulse 59   Ht 5' 8\" (1.727 m)   Wt 165 lb (74.8 kg)   BMI 25.09 kg/m²   GENERAL: well developed, well nourished,in no apparent distress  SKIN: no rashes,no suspicious lesions  HEENT: atraumatic, normocephalic, bilateral eyes without any redness or swelling around the eyes, extraocular muscles intact, pupils equal reactive to light bilaterally, no discharge seen  NECK: supple  EXTREMITIES: no cyanosis, or edema    ASSESSMENT AND PLAN:   Diagnoses and all orders for this visit:    Eye irritation  Can continue with the artificial tears and advised warm compresses  Carefully wash any discharge in the morning    Vitamin D deficiency  Take ergocalciferol for 12 weeks and then take over-the-counter vitamin D3 1000- 2000iu daily after the 12 weeks        Preventive medicine  Mammogram 7/2022  colonoscopy- 2/2021 dr cox and rpt in 5 yrs ie 2026   Pap smear- 1/20/2017 and 6/2022  by Dr. Kenny, due for 2027   Labs January 2023 , 2/2024 reviewed       The patient indicates understanding of these issues and agrees to the plan.  No follow-ups on file.

## 2025-02-17 ENCOUNTER — TELEPHONE (OUTPATIENT)
Dept: INTERNAL MEDICINE CLINIC | Facility: CLINIC | Age: 55
End: 2025-02-17

## 2025-02-17 ENCOUNTER — TELEPHONE (OUTPATIENT)
Dept: HEMATOLOGY/ONCOLOGY | Facility: HOSPITAL | Age: 55
End: 2025-02-17

## 2025-02-17 ENCOUNTER — TELEPHONE (OUTPATIENT)
Age: 55
End: 2025-02-17

## 2025-02-17 DIAGNOSIS — D72.818 OTHER DECREASED WHITE BLOOD CELL COUNT: Primary | ICD-10-CM

## 2025-02-17 NOTE — TELEPHONE ENCOUNTER
I received a call from Brenda of [facility].     Patient made appointment but didn't see referral as to why she needs to be seen.     Labs dated 2/3/25 reviewed. Needs to see Hemotology due to history of low WBC    Referral re-submitted so office can see it.     (Original referral was placed as an order)

## 2025-02-20 ENCOUNTER — OFFICE VISIT (OUTPATIENT)
Age: 55
End: 2025-02-20
Attending: INTERNAL MEDICINE
Payer: COMMERCIAL

## 2025-02-20 VITALS
DIASTOLIC BLOOD PRESSURE: 61 MMHG | BODY MASS INDEX: 24.73 KG/M2 | RESPIRATION RATE: 16 BRPM | HEIGHT: 68 IN | OXYGEN SATURATION: 100 % | TEMPERATURE: 98 F | SYSTOLIC BLOOD PRESSURE: 112 MMHG | WEIGHT: 163.19 LBS | HEART RATE: 59 BPM

## 2025-02-20 DIAGNOSIS — Z80.3 FAMILY HISTORY OF BREAST CANCER: ICD-10-CM

## 2025-02-20 DIAGNOSIS — T42.1X5A: ICD-10-CM

## 2025-02-20 DIAGNOSIS — R53.83 OTHER FATIGUE: ICD-10-CM

## 2025-02-20 DIAGNOSIS — D72.819 LEUKOPENIA, UNSPECIFIED TYPE: Primary | ICD-10-CM

## 2025-02-20 LAB
BASOPHILS # BLD AUTO: 0.02 X10(3) UL (ref 0–0.2)
BASOPHILS NFR BLD AUTO: 0.6 %
DEPRECATED HBV CORE AB SER IA-ACNC: 47 NG/ML
DEPRECATED RDW RBC AUTO: 35.5 FL (ref 35.1–46.3)
EOSINOPHIL # BLD AUTO: 0.09 X10(3) UL (ref 0–0.7)
EOSINOPHIL NFR BLD AUTO: 2.5 %
ERYTHROCYTE [DISTWIDTH] IN BLOOD BY AUTOMATED COUNT: 11.4 % (ref 11–15)
HCT VFR BLD AUTO: 34.1 %
HGB BLD-MCNC: 11.9 G/DL
IMM GRANULOCYTES # BLD AUTO: 0.01 X10(3) UL (ref 0–1)
IMM GRANULOCYTES NFR BLD: 0.3 %
IRON SATN MFR SERPL: 27 %
IRON SERPL-MCNC: 73 UG/DL
LDH SERPL L TO P-CCNC: 186 U/L
LYMPHOCYTES # BLD AUTO: 1.05 X10(3) UL (ref 1–4)
LYMPHOCYTES NFR BLD AUTO: 29.5 %
MCH RBC QN AUTO: 29.8 PG (ref 26–34)
MCHC RBC AUTO-ENTMCNC: 34.9 G/DL (ref 31–37)
MCV RBC AUTO: 85.3 FL
MONOCYTES # BLD AUTO: 0.34 X10(3) UL (ref 0.1–1)
MONOCYTES NFR BLD AUTO: 9.6 %
NEUTROPHILS # BLD AUTO: 2.05 X10 (3) UL (ref 1.5–7.7)
NEUTROPHILS # BLD AUTO: 2.05 X10(3) UL (ref 1.5–7.7)
NEUTROPHILS NFR BLD AUTO: 57.5 %
PLATELET # BLD AUTO: 285 10(3)UL (ref 150–450)
RBC # BLD AUTO: 4 X10(6)UL
TOTAL IRON BINDING CAPACITY: 274 UG/DL (ref 250–425)
TRANSFERRIN SERPL-MCNC: 212 MG/DL (ref 250–380)
WBC # BLD AUTO: 3.6 X10(3) UL (ref 4–11)

## 2025-02-20 PROCEDURE — 85060 BLOOD SMEAR INTERPRETATION: CPT | Performed by: INTERNAL MEDICINE
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
## 2025-02-24 ENCOUNTER — OFFICE VISIT (OUTPATIENT)
Dept: NEUROLOGY | Facility: CLINIC | Age: 55
End: 2025-02-24
Payer: COMMERCIAL

## 2025-02-24 DIAGNOSIS — R56.9 SEIZURE (HCC): Primary | ICD-10-CM

## 2025-02-24 PROCEDURE — 99213 OFFICE O/P EST LOW 20 MIN: CPT | Performed by: OTHER

## 2025-02-24 RX ORDER — CARBAMAZEPINE 200 MG/1
TABLET ORAL
Qty: 450 TABLET | Refills: 3 | Status: SHIPPED | OUTPATIENT
Start: 2025-02-24

## 2025-02-24 NOTE — PROGRESS NOTES
Neurology Follow up Visit     Referred By: Dr. Jang ref. provider found    Chief Complaint:   Chief Complaint   Patient presents with    Seizures     LOV 12/2023 Seen for Seizures.  Patient presents here today following up with epilepsy and medication management, last seizure was      , last lab was 2/3/25 Carbamazepine (Tegretol) 13.6 High. Currently taking carBAMazepine 200 MG.        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  She would stare off, never had any convulsive seizures.  She was treated with Tegretol, later switched to carbamazepine.  At 1 point there was a consideration to start medication but EEG was done and apparently it was abnormal, therefore she continued with carbamazepine, 1000 mg daily dose.  No side effects was reported in first visit with me in December 2023.  No recent seizures for a number of years.   The patient reports no seizures since their last visit in December 2023. They deny any side effects from their current medication regimen. The patient mentions experiencing sporadic episodes of blurred vision, which typically last for a while and resolve by the next morning. These episodes are not occurring daily. The patient compares the sensation to blurred vision experienced after excessive alcohol consumption in the past. They have not attempted to close one eye during these episodes to determine if the blurriness persists. The patient denies a history of migraines.    Past Medical History:    Anxiety state, unspecified    Hip pain    History of back pain    per NG    History of seizure    Seizure/Epilepsy. Rx: Tegretol; per NG    History of torn meniscus of left knee    Arthroscopy; per NG    Lipid screening    per NG    Lower back pain    Osteoarthritis    left knee    Seizure disorder (HCC)       Past Surgical History:   Procedure  Laterality Date    Appendectomy      per     Knee arthroscopy Left 09/19/2012    left knee arthropscopy    Knee surgery Left 02/08/2023    left total knee arthroplasty    Lasik Bilateral 2008    LASIK Plus in [place]    Removal of ovarian cyst(s)      Laparoscopic Ovarian Cystectomy; per     Spine surgery procedure unlisted  2019       Social history:  History   Smoking Status    Never   Smokeless Tobacco    Never     History   Alcohol Use Not Currently     History   Drug Use No       Family History   Problem Relation Age of Onset    Hypertension Father     Cataracts Father     Hypertension Mother     Breast Cancer Mother 60        Cause of death, Metastasis; per     Breast Cancer Sister         early 50's    Diabetes Neg     Glaucoma Neg     Heart Disorder Neg          Current Outpatient Medications:     Ferrous Sulfate 325 (65 Fe) MG Oral Tab, Take 1 tablet (325 mg total) by mouth every other day., Disp: 14 tablet, Rfl: 1    ergocalciferol 1.25 MG (15303 UT) Oral Cap, Take 1 capsule (50,000 Units total) by mouth once a week., Disp: 4 capsule, Rfl: 3    carBAMazepine 200 MG Oral Tab, TAKE 2 TABLETS BY MOUTH IN THE  MORNING AND 3 TABLETS BY MOUTH  IN THE EVENING, Disp: 450 tablet, Rfl: 3    Multiple Vitamins-Minerals (MULTI VITAMIN/MINERALS) Oral Tab, Take  by mouth daily., Disp: , Rfl:     No Known Allergies    ROS:   As in HPI, the rest of the 14 system review was done and was negative      Physical Exam:  There were no vitals filed for this visit.      General: No apparent distress, well nourished, well groomed.  Head- Normocephalic, atraumatic  Eyes- No redness or swelling  ENT- Hearing intake, normal glutition  Neck- No masses or adenopathy  Cv: pulses were palpable and normal, no cyanosis or edema     Neurological:     Mental Status- Alert and oriented x3.  Normal attention span and concentration  Thought process intact  Memory intact- recent and remote  Mood intact  Fund of knowledge appropriate for  education and age    Language intact including: comprehension, naming, repetition, vocabulary    Cranial Nerves:  VII. Face symmetric, no facial weakness  VIII. Hearing intact to whisper.  IX. Pallet elevates symmetrically.  XI. Shoulder shrug is intact  XII. Tongue is midline    Motor Exam:  Muscle tone normal  No atrophy or fasciculations  Strength- upper extremities 5/5 proximally and distally                    Rapid alternating movements intact    Gait:  Normal posture  Normal physiologic      Labs:    Lab Results   Component Value Date    TSH 3.475 02/03/2025     Lab Results   Component Value Date    HDL 90 (H) 02/03/2025     (H) 02/03/2025    TRIG 60 02/03/2025     Lab Results   Component Value Date    HGB 11.9 (L) 02/20/2025    HCT 34.1 (L) 02/20/2025    MCV 85.3 02/20/2025    WBC 3.6 (L) 02/20/2025    .0 02/20/2025      Lab Results   Component Value Date    BUN 12 02/03/2025    CA 9.0 02/03/2025    ALT 19 02/03/2025    AST 23 02/03/2025    ALKPHOS 59 05/10/2014    ALB 4.6 02/03/2025     02/03/2025    K 4.4 02/03/2025     02/03/2025    CO2 30.0 02/03/2025      I have reviewed labs.  In 2014 level of carbamazepine was normal.    Assessment   1. Seizure (HCC)  Absent epilepsy.  Continue with the same dose of carbamazepine with the patient.  We emphasized importance of compliance.  CBC, CMP and levels will be checked again.    - Monitor for blurred vision episodes: frequency, duration, and if monocular or binocular  - Await ophthalmologist evaluation results         Education and counseling provided to patient. Instructed patient to call my office or seek medical attention immediately if symptoms worsen.  Patient verbalized understanding of information given. All questions were answered. All side effects of drugs were discussed.       Return to clinic in: No follow-ups on file.    Ced Mccollum MD

## 2025-03-24 ENCOUNTER — HOSPITAL ENCOUNTER (OUTPATIENT)
Dept: MAMMOGRAPHY | Facility: HOSPITAL | Age: 55
Discharge: HOME | End: 2025-03-24
Attending: INTERNAL MEDICINE
Payer: COMMERCIAL

## 2025-03-24 DIAGNOSIS — Z12.31 SCREENING MAMMOGRAM, ENCOUNTER FOR: ICD-10-CM

## 2025-03-24 PROCEDURE — 77063 BREAST TOMOSYNTHESIS BI: CPT | Performed by: INTERNAL MEDICINE

## 2025-03-24 PROCEDURE — 77067 SCR MAMMO BI INCL CAD: CPT | Performed by: INTERNAL MEDICINE

## (undated) DIAGNOSIS — R56.9 SEIZURE (HCC): ICD-10-CM

## (undated) DEVICE — 3M™ STERI-DRAPE™ INCISE DRAPE 1050 (60CM X 45CM): Brand: STERI-DRAPE™

## (undated) DEVICE — GAMMEX® NON-LATEX PI ORTHO SIZE 9, STERILE POLYISOPRENE POWDER-FREE SURGICAL GLOVE: Brand: GAMMEX

## (undated) DEVICE — TRAY SURESTEP 16 BARDEX DRAIN

## (undated) DEVICE — BANDAGE,GAUZE,BULKEE II,4.5"X4.1YD,STRL: Brand: MEDLINE

## (undated) DEVICE — SUT PDS II 1 CT-1 Z347H

## (undated) DEVICE — SOLUTION SURG DURAPREP 26ML

## (undated) DEVICE — DRESSING BIOPATCH 1X4 BLUE

## (undated) DEVICE — BNDG COHESIVE W4INXL5YD TAN E

## (undated) DEVICE — PROXIMATE SKIN STAPLERS (35 WIDE) CONTAINS 35 STAINLESS STEEL STAPLES (FIXED HEAD): Brand: PROXIMATE

## (undated) DEVICE — HOOD: Brand: FLYTE

## (undated) DEVICE — 48MM HEADED SCREW-Z

## (undated) DEVICE — PROPHECY® ALIGNMENT GUIDES: Brand: PROPHECY

## (undated) DEVICE — SOLUTION  .9 3000ML

## (undated) DEVICE — GAUZE TRAY STERILE 4X4 12PLY

## (undated) DEVICE — 3M™ IOBAN™ 2 ANTIMICROBIAL INCISE DRAPE 6640EZ: Brand: IOBAN™ 2

## (undated) DEVICE — SOL NACL IRRIG 0.9% 1000ML BTL

## (undated) DEVICE — SUT VICRYL 0 CP-1 J267H

## (undated) DEVICE — BANDAGE FLXMSTR 11YDX6IN STRL

## (undated) DEVICE — GAMMEX® PI HYBRID SIZE 9, STERILE POWDER-FREE SURGICAL GLOVE, POLYISOPRENE AND NEOPRENE BLEND: Brand: GAMMEX

## (undated) DEVICE — 3M™ STERI-DRAPE™ U-DRAPE 1015: Brand: STERI-DRAPE™

## (undated) DEVICE — BNDG COMPR W6INXL12FT SMTH

## (undated) DEVICE — SKIN PREP TRAY 4 COMPARTM TRAY: Brand: MEDLINE INDUSTRIES, INC.

## (undated) DEVICE — KIT DRN 3/16IN PVC DRN 3 SPRG

## (undated) DEVICE — COTTON ROLL: Brand: DEROYAL

## (undated) DEVICE — WEBRIL COTTON UNDERCAST PADDING: Brand: WEBRIL

## (undated) DEVICE — WRAP COOLING KNEE W/ICE PILLOW

## (undated) DEVICE — Device: Brand: STABLECUT®

## (undated) DEVICE — 450 ML BOTTLE OF 0.05% CHLORHEXIDINE GLUCONATE IN 99.95% STERILE WATER FOR IRRIGATION, USP AND APPLICATOR.: Brand: IRRISEPT ANTIMICROBIAL WOUND LAVAGE

## (undated) DEVICE — PEN: MARKING STD PT 100/CS: Brand: MEDICAL ACTION INDUSTRIES

## (undated) DEVICE — TOTAL KNEE: Brand: MEDLINE INDUSTRIES, INC.

## (undated) DEVICE — 3M™ STERI-STRIP™ COMPOUND BENZOIN TINCTURE 40 BAGS/CARTON 4 CARTONS/CASE C1544: Brand: 3M™ STERI-STRIP™

## (undated) DEVICE — COTTON UNDERCAST PADDING,REGULAR FINISH: Brand: WEBRIL

## (undated) DEVICE — TROCAR-Z

## (undated) DEVICE — 3M™ STERI-DRAPE™ PATIENT ISOLATION DRAPE 1014: Brand: STERI-DRAPE™

## (undated) DEVICE — SUT MONOCRYL 3-0 PS-1 Y936H

## (undated) DEVICE — 3M™ STERI-STRIP™ REINFORCED ADHESIVE SKIN CLOSURES, R1547, 1/2 IN X 4 IN (12 MM X 100 MM), 6 STRIPS/ENVELOPE: Brand: 3M™ STERI-STRIP™

## (undated) DEVICE — 3M™ TEGADERM™ TRANSPARENT FILM DRESSING, 1626W, 4 IN X 4-3/4 IN (10 CM X 12 CM), 50 EACH/CARTON, 4 CARTON/CASE: Brand: 3M™ TEGADERM™

## (undated) DEVICE — CEMENT MIXING SYSTEM WITH FEMORAL BREAKWAY NOZZLE: Brand: REVOLUTION

## (undated) NOTE — LETTER
[facility]  [address], [place] [address]  [phone]        Dear Ayan Jeff MD,      I had the pleasure of seeing your patient, Muriel Love on 3/3/2021.      Below please find a summary of our vis

## (undated) NOTE — LETTER
November 10, 2020         Herbert Faulkner, [address] [facility]      Patient: Pietro Lees   YOB: 1970   Date of Visit: 11/10/2020       Dear Dr. Mynor Montemayor MD,    I saw your patient, Pietro Lees, on 11/10/

## (undated) NOTE — LETTER
AUTHORIZATION FOR SURGICAL OPERATION OR OTHER PROCEDURE    1.  I hereby authorize Dr. Marianela Nails , and [facility] staff assigned to my case to perform the following operation and/or procedure at the [facility]:    __________________________________ Time:  ________ A. M.  P.M.        Patient Name:  ______________________________________________________  (please print)      Patient signature:  ___________________________________________________             Relationship to Patient:

## (undated) NOTE — LETTER
8/9/2017              [facility]        [address] [id number]         Dear Lou Watt,      It was a pleasure to see you at our [address] [facility] office.  Normal mammogram -- simple cyst